# Patient Record
Sex: MALE | Race: WHITE
[De-identification: names, ages, dates, MRNs, and addresses within clinical notes are randomized per-mention and may not be internally consistent; named-entity substitution may affect disease eponyms.]

---

## 2018-02-27 ENCOUNTER — HOSPITAL ENCOUNTER (INPATIENT)
Dept: HOSPITAL 92 - CCL | Age: 53
LOS: 10 days | Discharge: HOME | DRG: 286 | End: 2018-03-09
Attending: INTERNAL MEDICINE | Admitting: INTERNAL MEDICINE
Payer: COMMERCIAL

## 2018-02-27 VITALS — BODY MASS INDEX: 21.1 KG/M2

## 2018-02-27 DIAGNOSIS — I48.0: ICD-10-CM

## 2018-02-27 DIAGNOSIS — I11.0: Primary | ICD-10-CM

## 2018-02-27 DIAGNOSIS — E87.6: ICD-10-CM

## 2018-02-27 DIAGNOSIS — R13.10: ICD-10-CM

## 2018-02-27 DIAGNOSIS — I08.1: ICD-10-CM

## 2018-02-27 DIAGNOSIS — E78.5: ICD-10-CM

## 2018-02-27 DIAGNOSIS — F20.9: ICD-10-CM

## 2018-02-27 DIAGNOSIS — I42.9: ICD-10-CM

## 2018-02-27 DIAGNOSIS — J69.0: ICD-10-CM

## 2018-02-27 DIAGNOSIS — F17.210: ICD-10-CM

## 2018-02-27 DIAGNOSIS — F31.9: ICD-10-CM

## 2018-02-27 DIAGNOSIS — B18.2: ICD-10-CM

## 2018-02-27 DIAGNOSIS — R11.2: ICD-10-CM

## 2018-02-27 DIAGNOSIS — I50.43: ICD-10-CM

## 2018-02-27 DIAGNOSIS — Z79.01: ICD-10-CM

## 2018-02-27 DIAGNOSIS — J95.821: ICD-10-CM

## 2018-02-27 DIAGNOSIS — K22.0: ICD-10-CM

## 2018-02-27 DIAGNOSIS — K74.69: ICD-10-CM

## 2018-02-27 DIAGNOSIS — K74.60: ICD-10-CM

## 2018-02-27 DIAGNOSIS — K76.6: ICD-10-CM

## 2018-02-27 DIAGNOSIS — G47.33: ICD-10-CM

## 2018-02-27 DIAGNOSIS — Z88.2: ICD-10-CM

## 2018-02-27 LAB
ANALYZER IN CARDIO: (no result)
ANALYZER IN CARDIO: (no result)
ANION GAP SERPL CALC-SCNC: 12 MMOL/L (ref 10–20)
BASE EXCESS STD BLDA CALC-SCNC: -0.7 MEQ/L
BASE EXCESS STD BLDA CALC-SCNC: 0.1 MEQ/L
BUN SERPL-MCNC: 10 MG/DL (ref 8.4–25.7)
CA-I BLDA-SCNC: 1.2 MMOL/L (ref 1.12–1.3)
CA-I BLDA-SCNC: 1.2 MMOL/L (ref 1.12–1.3)
CALCIUM SERPL-MCNC: 8.1 MG/DL (ref 7.8–10.44)
CHLORIDE SERPL-SCNC: 101 MMOL/L (ref 98–107)
CK MB SERPL-MCNC: 1.4 NG/ML (ref 0–6.6)
CO2 SERPL-SCNC: 26 MMOL/L (ref 22–29)
CREAT CL PREDICTED SERPL C-G-VRATE: 165 ML/MIN (ref 70–130)
GLUCOSE SERPL-MCNC: 81 MG/DL (ref 70–105)
HCO3 BLDA-SCNC: 27.9 MEQ/L (ref 22–26)
HCO3 BLDA-SCNC: 28 MEQ/L (ref 22–26)
HCT VFR BLDA CALC: 41.5 % (ref 42–52)
HCT VFR BLDA CALC: 48.1 % (ref 42–52)
HGB BLD-MCNC: 14.4 G/DL (ref 14–18)
HGB BLDA-MCNC: 12.3 G/DL (ref 14–18)
HGB BLDA-MCNC: 13.9 G/DL (ref 14–18)
MCH RBC QN AUTO: 31.8 PG (ref 27–31)
MCV RBC AUTO: 99.1 FL (ref 80–94)
MDIFF COMPLETE?: YES
O2 A-A PPRESDIFF RESPIRATORY: 343.98 MM[HG] (ref 0–20)
PCO2 BLDA: 60.5 MMHG (ref 35–45)
PCO2 BLDA: 64.9 MMHG (ref 35–45)
PH BLDA: 7.25 [PH] (ref 7.35–7.45)
PH BLDA: 7.28 [PH] (ref 7.35–7.45)
PLATELET # BLD AUTO: 196 THOU/UL (ref 130–400)
PLATELET BLD QL SMEAR: (no result)
PO2 BLDA: 70.5 MMHG (ref 80–100)
PO2 BLDA: 94.6 MMHG (ref 80–100)
POTASSIUM SERPL-SCNC: 3.5 MMOL/L (ref 3.5–5.1)
RBC # BLD AUTO: 4.53 MILL/UL (ref 4.7–6.1)
SODIUM SERPL-SCNC: 135 MMOL/L (ref 136–145)
SPECIMEN DRAWN FROM PATIENT: (no result)
SPECIMEN DRAWN FROM PATIENT: (no result)
TROPONIN I SERPL DL<=0.01 NG/ML-MCNC: 0.01 NG/ML (ref ?–0.03)
WBC # BLD AUTO: 3.5 THOU/UL (ref 4.8–10.8)

## 2018-02-27 PROCEDURE — 93798 PHYS/QHP OP CAR RHAB W/ECG: CPT

## 2018-02-27 PROCEDURE — C1769 GUIDE WIRE: HCPCS

## 2018-02-27 PROCEDURE — 94002 VENT MGMT INPAT INIT DAY: CPT

## 2018-02-27 PROCEDURE — 5A1945Z RESPIRATORY VENTILATION, 24-96 CONSECUTIVE HOURS: ICD-10-PCS | Performed by: INTERNAL MEDICINE

## 2018-02-27 PROCEDURE — P9047 ALBUMIN (HUMAN), 25%, 50ML: HCPCS

## 2018-02-27 PROCEDURE — 71045 X-RAY EXAM CHEST 1 VIEW: CPT

## 2018-02-27 PROCEDURE — 93005 ELECTROCARDIOGRAM TRACING: CPT

## 2018-02-27 PROCEDURE — 85018 HEMOGLOBIN: CPT

## 2018-02-27 PROCEDURE — 74220 X-RAY XM ESOPHAGUS 1CNTRST: CPT

## 2018-02-27 PROCEDURE — A4216 STERILE WATER/SALINE, 10 ML: HCPCS

## 2018-02-27 PROCEDURE — B246ZZ4 ULTRASONOGRAPHY OF RIGHT AND LEFT HEART, TRANSESOPHAGEAL: ICD-10-PCS | Performed by: INTERNAL MEDICINE

## 2018-02-27 PROCEDURE — 80076 HEPATIC FUNCTION PANEL: CPT

## 2018-02-27 PROCEDURE — 93306 TTE W/DOPPLER COMPLETE: CPT

## 2018-02-27 PROCEDURE — 93010 ELECTROCARDIOGRAM REPORT: CPT

## 2018-02-27 PROCEDURE — 99152 MOD SED SAME PHYS/QHP 5/>YRS: CPT

## 2018-02-27 PROCEDURE — 85049 AUTOMATED PLATELET COUNT: CPT

## 2018-02-27 PROCEDURE — 93312 ECHO TRANSESOPHAGEAL: CPT

## 2018-02-27 PROCEDURE — 87040 BLOOD CULTURE FOR BACTERIA: CPT

## 2018-02-27 PROCEDURE — 92960 CARDIOVERSION ELECTRIC EXT: CPT

## 2018-02-27 PROCEDURE — 36416 COLLJ CAPILLARY BLOOD SPEC: CPT

## 2018-02-27 PROCEDURE — 5A2204Z RESTORATION OF CARDIAC RHYTHM, SINGLE: ICD-10-PCS | Performed by: INTERNAL MEDICINE

## 2018-02-27 PROCEDURE — 85007 BL SMEAR W/DIFF WBC COUNT: CPT

## 2018-02-27 PROCEDURE — 93458 L HRT ARTERY/VENTRICLE ANGIO: CPT

## 2018-02-27 PROCEDURE — 85025 COMPLETE CBC W/AUTO DIFF WBC: CPT

## 2018-02-27 PROCEDURE — 85014 HEMATOCRIT: CPT

## 2018-02-27 PROCEDURE — 80053 COMPREHEN METABOLIC PANEL: CPT

## 2018-02-27 PROCEDURE — 94003 VENT MGMT INPAT SUBQ DAY: CPT

## 2018-02-27 PROCEDURE — 84484 ASSAY OF TROPONIN QUANT: CPT

## 2018-02-27 PROCEDURE — 36415 COLL VENOUS BLD VENIPUNCTURE: CPT

## 2018-02-27 PROCEDURE — 71046 X-RAY EXAM CHEST 2 VIEWS: CPT

## 2018-02-27 PROCEDURE — 82553 CREATINE MB FRACTION: CPT

## 2018-02-27 PROCEDURE — 85610 PROTHROMBIN TIME: CPT

## 2018-02-27 PROCEDURE — 85060 BLOOD SMEAR INTERPRETATION: CPT

## 2018-02-27 PROCEDURE — 80048 BASIC METABOLIC PNL TOTAL CA: CPT

## 2018-02-27 PROCEDURE — 85027 COMPLETE CBC AUTOMATED: CPT

## 2018-02-27 PROCEDURE — 0BH17EZ INSERTION OF ENDOTRACHEAL AIRWAY INTO TRACHEA, VIA NATURAL OR ARTIFICIAL OPENING: ICD-10-PCS | Performed by: INTERNAL MEDICINE

## 2018-02-27 PROCEDURE — 82565 ASSAY OF CREATININE: CPT

## 2018-02-27 PROCEDURE — 82805 BLOOD GASES W/O2 SATURATION: CPT

## 2018-02-27 RX ADMIN — FENTANYL CITRATE SCH MLS: 50 INJECTION, SOLUTION INTRAMUSCULAR; INTRAVENOUS at 17:45

## 2018-02-27 NOTE — HP
DATE OF CONSULTATION:  02/27/2018

 

CRITICAL CARE TIME:  1 hour.

 

HISTORY OF PRESENT ILLNESS:  This is an unfortunate 52-year-old gentleman with 
a history of severe cardiomyopathy who underwent electrical cardioversion and 
developed acute respiratory failure.  The patient has a previous history of 
congestive heart failure.  He was seen initially several days ago with rapid 
atrial fibrillation and he was started on digoxin.  The patient today underwent 
electrical cardioversion and following the procedure, he went into respiratory 
distress.

 

PAST MEDICAL HISTORY:

1.  Severe cardiomyopathy.

2.  Atrial fibrillation.

3.  Bipolar disorder.

4.  Hepatitis C.

5.  Hyperlipidemia.

6.  Hypertension.

 

ALLERGIES:  SULFA DRUGS.

 

MEDICATION ON ADMISSION: Amiodarone 400 b.i.d., Lipitor 10 at bedtime, digoxin 
0.5 daily, Aldactone 25 daily, valsartan 160 daily, Lasix 40 daily, metoprolol 
50 b.i.d., apixaban 5 b.i.d., potassium 20 b.i.d., iron sulfate 325 daily.

 

PHYSICAL EXAMINATION:

GENERAL:  Intubated gentleman.

VITAL SIGNS:  Blood pressure 84/56.

NECK:  Showed jugular venous distention in the jaw.

LUNGS:  Crackles throughout both lung fields.

HEART:  Regular rate and rhythm with a normal S1, S2, 2/6 systolic murmur.

ABDOMEN:  Distended.

EXTREMITIES:  Showed severe edema.

 

LABORATORY RESULTS AND IMAGING:  White blood cell count 7.0, hemoglobin 12.1, 
hematocrit 28.1 and his platelets are 218.  His sodium was 136, potassium 3.7, 
chloride 102, bicarbonate 26, BUN 10, creatinine 0.92, glucose is 54.  His EKG 
revealed normal sinus rhythm, nonspecific ST abnormality.

 

IMPRESSION:

1.  Respiratory failure.

2.  Congestive heart failure.

3.  Severe cardiomyopathy.

4.  Atrial fibrillation.

5.  Hypertension.

6.  Dyslipidemia.

7.  Tobacco abuse.

8.  Hepatitis C.

 

This gentleman presents with respiratory failure after undergoing electrical 
cardioversion.  From a cardiac standpoint, he will remain on IV amiodarone.  We 
will continue with IV amiodarone to maintain sinus rhythm.  The patient is 
being diuresed with IV Lasix.  His prognosis is very guarded.

 

[facility]

## 2018-02-27 NOTE — RAD
AP CHEST:

 

Indication: Possible aspiration. Intubation. 

 

Comparison: Two views chest, 11-15-17

 

FINDINGS: 

There is moderate cardiomegaly with mild to moderate pulmonary vascular congestion. There are patchy 
airspace opacities within the right lung base which can be seen with aspiration. No confluent airspac
e opacity, pleural effusion or pneumothorax is evident. ET tube tip is seen in the expected region ne
ar the level of the thoracic inlet. No acute osseous abnormality is evident. There is a mildly displa
gloria posterior lateral right 6th rib fracture. 

 

IMPRESSION: 

1. Cardiomegaly with pulmonary vascular congestion. 

2. Patchy opacities within the right lung base may reflect airspace edema or could be related to aspi
ration. Continued follow up is recommended. 

3. There is a mildly displaced right posterior lateral 6th rib fracture which is new from the compari
son. 

 

POS: [facility]

## 2018-02-27 NOTE — ECHO
52-year-old gentleman with severe cardiomyopathy and atrial fibrillation.  

 

Patient taken to PACU. The patient was sedated by anesthesiology.  
Transesophageal probe was removed 

 The patient developed respiratory distress and was intubated.

FINDINGS: 

1.  Severe decrease in left systolic function.

2.  Left ventricle is markedly dilated.

3.  The right atrium is markedly dilated.

4.  Left atrial enlargement

5.  Moderate mitral regurgitation.

6.  Moderate to severe tricuspid regurgitation.

7.  No thrombus in left atrium or left atrial appendage. 

8.  Atherosclerotic debris in the descending.

 

IMPRESSION:  

No formed thrombus in left atrium or left atrial appendage.

MTDD

## 2018-02-27 NOTE — RAD
AP VIEW CHEST

2/27/18

 

HISTORY: 

Ventilator patient. 

 

AP view chest obtained on 2/27/18.

 

Comparison made to a previous exam from earlier in the day on 2/27/18.

 

AP view chest demonstrates placement of a nasogastric tube since the previous exam. Distal tip not vi
sualized. The endotracheal tube is in good position. 

 

Bilateral air space opacity seen compatible with pulmonary edema. These appear to have increased sinc
e the previous comparison exam from earlier in the day. 

 

There is also some blunting of the costophrenic angles with veiling densities in the lung bases carla
tible with possible bilateral pleural effusions. 

 

IMPRESSION:  

Increasing extensive perihilar opacities since the previous exam from earlier in the day. This may re
present developing congestive  heart failure and pulmonary edema. 

 

POS: [facility]

## 2018-02-28 LAB
ANALYZER IN CARDIO: (no result)
ANION GAP SERPL CALC-SCNC: 11 MMOL/L (ref 10–20)
BASE EXCESS STD BLDA CALC-SCNC: 4.2 MEQ/L
BUN SERPL-MCNC: 14 MG/DL (ref 8.4–25.7)
CA-I BLDA-SCNC: 1.1 MMOL/L (ref 1.12–1.3)
CALCIUM SERPL-MCNC: 8.4 MG/DL (ref 7.8–10.44)
CHLORIDE SERPL-SCNC: 103 MMOL/L (ref 98–107)
CO2 SERPL-SCNC: 26 MMOL/L (ref 22–29)
CREAT CL PREDICTED SERPL C-G-VRATE: 160 ML/MIN (ref 70–130)
GLUCOSE SERPL-MCNC: 75 MG/DL (ref 70–105)
HCO3 BLDA-SCNC: 27.5 MEQ/L (ref 22–26)
HCT VFR BLDA CALC: 41.7 % (ref 42–52)
HGB BLD-MCNC: 13.2 G/DL (ref 14–18)
HGB BLDA-MCNC: 12.9 G/DL (ref 14–18)
MCH RBC QN AUTO: 31.3 PG (ref 27–31)
MCV RBC AUTO: 96.3 FL (ref 80–94)
MDIFF COMPLETE?: YES
O2 A-A PPRESDIFF RESPIRATORY: 267.18 MM[HG] (ref 0–20)
PCO2 BLDA: 36.5 MMHG (ref 35–45)
PH BLDA: 7.5 [PH] (ref 7.35–7.45)
PLATELET # BLD AUTO: 175 THOU/UL (ref 130–400)
PLATELET BLD QL SMEAR: (no result)
PO2 BLDA: 186.3 MMHG (ref 80–100)
POTASSIUM SERPL-SCNC: 4.2 MMOL/L (ref 3.5–5.1)
RBC # BLD AUTO: 4.22 MILL/UL (ref 4.7–6.1)
REFLEX FOR REVIEW??: YES
SODIUM SERPL-SCNC: 136 MMOL/L (ref 136–145)
SPECIMEN DRAWN FROM PATIENT: (no result)
WBC # BLD AUTO: 11.2 THOU/UL (ref 4.8–10.8)

## 2018-02-28 RX ADMIN — FENTANYL CITRATE SCH MLS: 50 INJECTION, SOLUTION INTRAMUSCULAR; INTRAVENOUS at 13:09

## 2018-02-28 RX ADMIN — Medication SCH ML: at 09:58

## 2018-02-28 RX ADMIN — Medication SCH ML: at 21:29

## 2018-02-28 NOTE — EKG
Test Reason : S/P PATRICE CARDIOVERSIO

Blood Pressure : ***/*** mmHG

Vent. Rate : 071 BPM     Atrial Rate : 071 BPM

   P-R Int : 172 ms          QRS Dur : 092 ms

    QT Int : 420 ms       P-R-T Axes : 065 -07 025 degrees

   QTc Int : 456 ms

 

Sinus rhythm with Premature atrial complexes

Nonspecific ST abnormality

Poor anterior R wave progression

Abnormal ECG

No previous ECGs available

Confirmed by DR. OLESYA MCLEOD (3) on 2/28/2018 7:28:11 AM

 

Referred By:  NYLA           Confirmed By:DR. OLESYA MCLEOD

## 2018-02-28 NOTE — EKG
Test Reason : 

Blood Pressure : ***/*** mmHG

Vent. Rate : 120 BPM     Atrial Rate : 120 BPM

   P-R Int : 176 ms          QRS Dur : 070 ms

    QT Int : 308 ms       P-R-T Axes : 070 034 064 degrees

   QTc Int : 435 ms

 

Sinus tachycardia

Possible Left atrial enlargement

Septal infarct , age undetermined

Abnormal ECG

When compared with ECG of 27-FEB-2018 10:21, (Unconfirmed)

Premature atrial complexes are no longer Present

Vent. rate has increased BY  49 BPM

QRS duration has decreased

Septal infarct is now Present

Nonspecific T wave abnormality now evident in Lateral leads

Confirmed by DR. OLESYA MCLEOD (3) on 2/28/2018 6:49:13 PM

 

Referred By:  YARELI           Confirmed By:DR. OLESYA MCLEOD

## 2018-02-28 NOTE — RAD
PORTABLE AP CHEST RADIOGRAPH:

 

Date: 2-28-18 

 

History: On ventilator. Follow up evaluation. 

 

Comparison: 2-27-18

 

FINDINGS: 

Endotracheal tube and nasogastric tubes remain in place and unchanged in position. Again noted are in
creased interstitial and alveolar opacities within the lungs bilaterally with mild improvement in franco
eolar opacities bilaterally. Cardiac silhouette is magnified by projection but does appear mildly enl
arged. Pulmonary vasculature is within normal limits. No other interval change. 

 

IMPRESSION: 

Improvement in interstitial and alveolar opacities bilaterally. This may represent mild improvement i
n asymmetric pulmonary edema or infectious process. Continued follow up is recommended. 

 

POS: [facility]

## 2018-02-28 NOTE — CON
DATE OF CONSULTATION:  02/27/2018

 

HISTORY OF PRESENT ILLNESS:  Mr. Orozco is a gentleman who was admitted for transesophageal echo and c
ardioversion.  After this procedure, apparently developed respiratory distress, was intubated and was
 transferred to the ICU, I was consulted.

 

PAST MEDICAL HISTORY:

1.  Remarkable for atrial fibrillation.

2.  History of severe cardiomyopathy.

3.  History of bipolar disorder.

4.  History of hepatitis C.

5.  History of lipid disorder.

6.  History of hypertension.

 

SOCIAL HISTORY:  It is unknown whether he smokes or drinks.

 

ALLERGIES:  There is a SULFA allergy reported.

 

MEDICATIONS:  Have been reviewed.

 

REVIEW OF SYSTEMS:  Not obtainable since he is intubated.  I did meet with his mom and answered all o
f her questions.

 

PHYSICAL EXAMINATION:

GENERAL:  He initially was mildly hypertensive and then became hypotensive after Lasix that was given
 earlier kicked in.  He had over a liter out of his bladder when his Herrera was inserted after arrival
 in the ICU, required the addition of dopamine.  He was given a liter of fluid back.  He has had prog
ressive respiratory distress throughout the day and required deep sedation and paralytics.  He appear
s older than his age.

VITAL SIGNS:  Currently, his blood pressure 103/72, heart rate is 94, respiratory rate is 24, oximetr
y is 97.  His PEEP had to be turned up to 13 earlier.

HEENT:  His pupils react.  Sclerae is anicteric.

NECK:  Supple.

LUNGS:  Remarkable for diffuse rhonchi and crackles.

HEART:  Regular rhythm.

ABDOMEN:  Soft and nontender.

EXTREMITIES:  Without asymmetry or clubbing or cyanosis.

NEUROLOGIC:  Nonfocal.

 

LABORATORY DATA:  White count 3.5, hemoglobin 14.4, platelets 196.  He had 40% bands on his periphera
l smear.  Blood gas pH of 7.25, CO2 of 64, pO2 of 70.

 

Sodium 135, potassium 3.5, chloride 101, bicarbonate 26, BUN 10, creatinine 0.82.

 

IMPRESSION:  Respiratory failure.  Chest radiograph shows progressive bilateral infiltrates.  I would
 like to believe that his left shift is simply demargination with the stress of acute congestive hear
t failure.

 

I do think broad antimicrobial therapy should be started after blood cultures were done.  I doubt thi
s is pneumonia, but it still certainly in the differential.  He appears to hopefully be stabilizing.

 

Critical care with multiple visits and interventions as well as multiple mechanical ventilation soriano
 throughout the day total of 90 minutes.

## 2018-02-28 NOTE — PRG
DATE OF SERVICE:  02/28/2018

 

SUBJECTIVE:  Chris Orozco was evaluated this morning and this afternoon.  He is awake and alert.  He mo
ves all extremities.  He writes and can communicate.  He is still on 3 mcg of dopamine.  His blood pr
essure is in the 90s.  Try to wean off the dopamine with a goal pressure in the 80s.  A 25% albumin m
ay help to maintain his blood pressure.

 

PHYSICAL EXAMINATION:

LUNGS:  Remarkable for coarse equal breath sounds.

HEART:  Regular rhythm.

ABDOMEN:  Soft and nontender.

EXTREMITIES:  Without asymmetry.

 

LABORATORY DATA AND IMAGING DATA:  White count 11.2, hemoglobin 13.2, platelets 175.

 

Electrolytes are normal.

 

A pH 7.5, pCO2 36, pO2 186.

 

We have turned his rate down, and I will turn him down further to a rate of 80; he is down to an FiO2
 of 40%.

 

We turned his PEEP down.

 

I reviewed his chest radiograph.

 

Chest radiograph still shows infiltrates, but his infiltrates appear to have improved somewhat.

 

IMPRESSION:

1.  Acute congestive heart failure.

2. ?  pulmonary hemorrhage or a component of noncardiogenic pulmonary edema on presentation.  I doubt
 this is all pneumonia given that he presented for an elective cardioversion.  We will continue with 
empiric antimicrobial therapy for now to gradually decrease ventilatory support.

 

CRITICAL CARE TIME:  30 minutes.

## 2018-03-01 LAB
ANALYZER IN CARDIO: (no result)
ANION GAP SERPL CALC-SCNC: 10 MMOL/L (ref 10–20)
BASE EXCESS STD BLDA CALC-SCNC: 2.3 MEQ/L
BUN SERPL-MCNC: 19 MG/DL (ref 8.4–25.7)
CA-I BLDA-SCNC: 1.2 MMOL/L (ref 1.12–1.3)
CALCIUM SERPL-MCNC: 8.7 MG/DL (ref 7.8–10.44)
CHLORIDE SERPL-SCNC: 103 MMOL/L (ref 98–107)
CO2 SERPL-SCNC: 26 MMOL/L (ref 22–29)
CREAT CL PREDICTED SERPL C-G-VRATE: 156 ML/MIN (ref 70–130)
GLUCOSE SERPL-MCNC: 48 MG/DL (ref 70–105)
HCO3 BLDA-SCNC: 27.6 MEQ/L (ref 22–26)
HCT VFR BLDA CALC: 35.5 % (ref 42–52)
HGB BLD-MCNC: 11.8 G/DL (ref 14–18)
HGB BLDA-MCNC: 11.1 G/DL (ref 14–18)
O2 A-A PPRESDIFF RESPIRATORY: 132.68 MM[HG] (ref 0–20)
PCO2 BLDA: 46.1 MMHG (ref 35–45)
PH BLDA: 7.4 [PH] (ref 7.35–7.45)
PLATELET # BLD AUTO: 150 THOU/UL (ref 130–400)
PO2 BLDA: 94.9 MMHG (ref 80–100)
POTASSIUM SERPL-SCNC: 3.7 MMOL/L (ref 3.5–5.1)
SODIUM SERPL-SCNC: 135 MMOL/L (ref 136–145)
SPECIMEN DRAWN FROM PATIENT: (no result)

## 2018-03-01 RX ADMIN — FENTANYL CITRATE SCH MLS: 50 INJECTION, SOLUTION INTRAMUSCULAR; INTRAVENOUS at 18:52

## 2018-03-01 RX ADMIN — Medication SCH ML: at 09:41

## 2018-03-01 RX ADMIN — Medication SCH ML: at 21:58

## 2018-03-01 NOTE — PRG
DATE OF SERVICE:  03/01/2018

 

Mr. Orozco is awake this morning.  He can move all of his extremities.  He nodded to questions.  He de
nies having fever leading up to this admission. 

 

PHYSICAL EXAMINATION: 

VITAL SIGNS:  Blood pressure 110/71, heart rate 80, respiratory rate 17.

Intake and outputs negative 184.

LUNGS:  Remarkable for coarse rhonchi diffusely.

CARDIOVASCULAR:  Regular rhythm.  S1 and S2 are normal.

ABDOMEN:  Soft and nontender.

EXTREMITIES:  Without asymmetry.

 

Hemoglobin this morning was 11.8, platelets 150,000, white count yesterday was 11.2.

 

Sodium 135, potassium 3.7, chloride 103, bicarb 26, creatinine 0.86.

 

IMPRESSION:

1.  Cardiomyopathy.

2.  Atrial fibrillation, status post cardioversion.

3.  Pulmonary edema.

4.  ?  Component of pneumonia.  It is puzzling why he did not clear quickly with mechanical ventilati
on and makes me worry that maybe he has developing pneumonia when he presented to the hospital for hi
s cardioversion, he is being treated empirically.

 

Blood cultures are negative so far.

 

We are making progress, but he is not weanable at this point in time in my opinion.  I explained this
 to him.  It is okay if he is kept sedated.  I reviewed his chest radiograph and do not see a signifi
cant change in his radiograph from yesterday to today.  If this is all cardiogenic pulmonary edema I 
would expect some gradual improvement.  I suppose he could have alveolar hemorrhage with his congesti
ve heart failure which would lead to an abnormal radiograph, but he has had no hemoptysis.  We will c
ontinue with current critical care support.  It is fine to begin nutritional support.

 

Critical care time was 30 minutes.

## 2018-03-01 NOTE — RAD
SINGLE VIEW OF THE CHEST:

 

Comparison: 2-28-18

 

Indication:  Ventilated patient. 

 

FINDINGS: 

Progressive opacification is seen at the right hemithorax. There remains a prominent edema. Support l
yesy and tubes remain. Extrinsic artifact limits detail. Cardiac silhouette and pulmonary vasculature
 are enlarged. 

 

IMPRESSION: 

Extensive bilateral opacities, right greater than left, indicating edema. Slightly more confluent on 
the right. Continued follow up is warranted. 

 

POS: [facility]

## 2018-03-02 LAB
ANION GAP SERPL CALC-SCNC: 10 MMOL/L (ref 10–20)
APTT PPP: 35.5 SEC (ref 22.9–36.1)
BUN SERPL-MCNC: 21 MG/DL (ref 8.4–25.7)
CALCIUM SERPL-MCNC: 8.8 MG/DL (ref 7.8–10.44)
CHLORIDE SERPL-SCNC: 104 MMOL/L (ref 98–107)
CO2 SERPL-SCNC: 29 MMOL/L (ref 22–29)
CREAT CL PREDICTED SERPL C-G-VRATE: 153 ML/MIN (ref 70–130)
GLUCOSE SERPL-MCNC: 79 MG/DL (ref 70–105)
HGB BLD-MCNC: 12.1 G/DL (ref 14–18)
INR PPP: 1.1
MCH RBC QN AUTO: 31.1 PG (ref 27–31)
MCV RBC AUTO: 99.6 FL (ref 80–94)
MDIFF COMPLETE?: YES
PLATELET # BLD AUTO: 170 THOU/UL (ref 130–400)
PLATELET BLD QL SMEAR: (no result)
POLYCHROMASIA BLD QL SMEAR: (no result) (100X)
POTASSIUM SERPL-SCNC: 3.8 MMOL/L (ref 3.5–5.1)
PROTHROMBIN TIME: 14 SEC (ref 12–14.7)
RBC # BLD AUTO: 3.88 MILL/UL (ref 4.7–6.1)
SODIUM SERPL-SCNC: 139 MMOL/L (ref 136–145)
WBC # BLD AUTO: 13.3 THOU/UL (ref 4.8–10.8)

## 2018-03-02 RX ADMIN — Medication SCH ML: at 21:19

## 2018-03-02 RX ADMIN — Medication SCH ML: at 09:18

## 2018-03-02 NOTE — PRG
DATE OF SERVICE:  03/02/2018

 

SUBJECTIVE:  Mr. Orozco apparently became very combative last night.  He is schizophrenic, so I have s
tarted him on Haldol, to be given 10 mg IM q.12 hours as well as 1 mg Ativan with it, IV q.12 hours i
s scheduled at the same time as the Haldol.

 

He was calm when I evaluated this morning.  His nutrition has been started.

 

OBJECTIVE:

LUNGS:  Still remarkable for coarse breath sounds.

HEART:  Regular rhythm.

ABDOMEN:  Soft.

EXTREMITIES:  Without asymmetry.

 

IMAGING DATA:  Chest radiographs still shows bilateral alveolar infiltrates.

 

IMPRESSION:

1.  ? pneumonia with a big left shift on presentation for cardioversion.

2.  Status post urgent intubation after cardioversion because of hypoxemia and respiratory distress.

3.  Underlying cardiomyopathy.

4.  Status post cardioversion for atrial fibrillation.

5.  History of hepatitis C.

6.  Bipolar disorder.

7.  History of lipid disorder.

8.  History of hypertension.

 

PLAN:  He still has diffuse alveolar infiltrates bilaterally on radiograph when reviewed by me.  His 
intake and output is positive 456.  His blood pressure will not allow a lot of diuresis, but I suspec
t if this was simply pulmonary edema, he would be getting better radiographically each day.  I suspec
t this is either alveolar hemorrhage with decompensated heart failure or pneumonia.  He does appear t
o have stabilized.  We will add Precedex today.  We will continue on the Haldol.  Hopefully, the Prec
edex will allow minimization of his sedation.

 

CRITICAL CARE TIME:  30 minutes.

## 2018-03-02 NOTE — RAD
PORTABLE CHEST 1 VIEW:

 

Date:  03/02/18 

Time:  0457 hours

 

HISTORY:  

Respiratory failure. 

 

FINDINGS/IMPRESSION: 

No significant interval change is seen since the previous day's exam. 

 

 

POS: [facility]

## 2018-03-02 NOTE — PQF
CLINICAL DOCUMENTATION IMPROVEMENT CLARIFICATION FORM:  ICD-10 Updated



PLEASE DO AN ADDENDUM TO THE PROGRESS NOTE WITH ANY DOCUMENTATION UPDATES OR 
ADDITIONS AND CARRY THROUGH TO DC SUMMARY.   THANK YOU.



DATE:  3/2                                                                     
                ATTN:  DR. STEFANY REDMOND



Please exercise your independent, professional judgment in responding to the 
clarification form. Clinical indicators are provided on the bottom of this form 
for your review.



Please check appropriate box(s):



      HEART FAILURE:



A.  TYPE:

             [  ] Systolic / HFrEF      [  ] Diastolic / HFpEF       [  ] 
Combined Systolic / Diastolic

B.  ACUITY

             [  ] Acute          [  ] Acute on Chronic          [  ] Chronic



[  ] Other diagnosis ___________

[  ] Unable to determine



In addition, please specify:

Present on Admission (POA):  [  ] Yes             [  ] No             [  ] 
Unable to determine



For continuity of documentation, please document condition throughout progress 
notes and discharge summary.  Thank You.



CLINICAL INDICATORS - SIGNS / SYMPTOMS / LABS



PHYSICIAN H&P DOCUMENTATION 2/27:  HX OF PRESENT ILLNESS:  HX OF SEVERE 
CARDIOMYOPATHY WHO UNDERWENT ELECTRICAL CARDIOVERSION & DEVELOPED ACUTE 
RESPIRATORY FAILURE.  THE PATIENT HAS A PREVIOUS HX OF CHF.  



PHYSICAL EXAM:  NECK:  SHOWED JVD IN THE JAW; EXTREMITIES:  SHOWED SEVERE 
EDEMA.       



IMPRESSION:  2. CONGESTIVE HEART FAILURE; 3.  SEVERE CARDIOMYOPATHY



PULMONOLOGY PN 2/28:  IMPRESSION:  1)  ACUTE CONGESTIVE HEART FAILURE



2/27 ECHO:  EF 35-40%, L ATRIUM MILDLY DILATED, MODERATELY ENLARGED R ATRIUM 
SIZE, L VENTRICULAR SIZE IS MILDLY INCREASED, SEVERELY ENLARGED R VENTRICLE 
CAVITY



RISKS:

HX OF CHF

SEVERE CARDIOMYOPATHY

HTN



TREATMENTS:

CCU MONITORING

ONE TIME DOSE IV LASIX BY ANESTHESIOLOGY PRIOR TO INTUBATION (2/27)

ECHO (2/27)



THANK YOU!  Sangeetha



(This form is maintained as a part of the permanent medical record)

 2015 [employer].  All Rights Reserved

Sangeetha Connelly, RN, BSN    [email or web address]    Office:  [phone]

                                                              

[facility]

## 2018-03-03 RX ADMIN — Medication SCH ML: at 08:46

## 2018-03-03 RX ADMIN — Medication SCH ML: at 21:09

## 2018-03-03 NOTE — RAD
RADIOGRAPH CHEST 1 VIEW:

 

Date: 3/3/18.

Time: 5:26 a.m.

 

HISTORY: 

A 52-year-old male in respiratory distress.

 

COMPARISON: 

3/2/18, 4:57 a.m.

 

FINDINGS:

Endotracheal tube.  On yesterday's study, a long segment of the nasogastric tube distal portion was t
ransversely oriented over the cardiac shadow, with distal tip just to the right of the junction betwe
en the mediastinum and right lateral cardiac border.  That has also been removed.  Severe cardiomegal
y remains.  There continues to be pulmonary edema, but this has improved significantly.  No pneumotho
rax.  Consolidation of left lower lobe.

 

IMPRESSION:

1.  Severe cardiomegaly and congestive heart failure with pulmonary interstitial edema, which has imp
roved since yesterday.

 

2.  Interval removal of endotracheal tube.

 

3.  Interval removal of the nasogastric tube, which was previously transversely oriented across the m
id to lower chest, suspected to have been in extraluminal location.  Recommend CT of the chest.

 

4.  Left lower lobe consolidation and left pleural effusion remain.

 

 

CODE T

 

 

JN []

 

POS: [facility]

## 2018-03-03 NOTE — PRG
DATE OF SERVICE:  03/03/2018

 

SERVICE:  Pulmonary Medicine

 

INTERVAL HISTORY:  The patient is doing great from a cardiovascular and 
respiratory standpoint.  Yesterday, we put him on 21% FiO2 and a PEEP of 5.  He 
tolerated this just fine.  We minimized sedation in preparation for extubating 
him this morning.  That being said, he beat us to the punch and ultimately self 
extubated.  He did extraordinarily well after this event occurred.  He is on 2 
liters nasal cannula and is in no respiratory distress.  He is a little bit 
confused right now and is pulling on lines and tubes.  That being said, he is 
not in any distress otherwise.

 

PHYSICAL EXAMINATION:

VITAL SIGNS:  Afebrile, pulse 68, blood pressure 145/92, respirations 17, 
saturation 96% on 2 liters nasal cannula.

GENERAL:  The patient is awake and alert.  No apparent distress.

LUNGS:  Decent air entry.  Crackles are present but minimal.  No prolonged 
expiratory phase or wheezing.

HEART:  Normal rate, regular.

ABDOMEN:  Soft, nontender, nondistended.  Bowel sounds are positive.

MUSCULOSKELETAL:  No cyanosis or clubbing.  There is 1+ pitting in the 
bilateral lower extremities.

NEUROLOGIC:  Grossly nonfocal.

 

LABORATORY DATA:  WBC 13.3, hemoglobin 12.1, platelets 170,000.  INR 1.1.  
Basic metabolic profile is completely unremarkable.  Blood cultures x2 are 
unremarkable.

 

IMAGING:  Chest x-ray demonstrates interval extubation.  Significant 
improvement in the alveolar airspace disease.  There is a loose left 
costophrenic angle suggesting a possible effusion on that side.  There is also 
blunting to the right costophrenic angle.  The heart is enlarged on this AP 
film.

 

ASSESSMENT:

1.  Acute hypoxic respiratory failure.

2.  Negative pressure pulmonary edema, suspected.

3.  Atrial fibrillation, status post cardioversion, currently normal sinus 
rhythm.

 

PLAN:  The patient is doing absolutely fantastic post-extubation.  We will 
discontinue his Herrera catheter and any other lines or tubes are attached to him 
to minimize the acute confusional state that he is in.  He denies any 
significant alcohol use.  We will mobilize him through the day and if he meets 
criteria, we will consider getting him out of the ICU.

 

SANDRA

## 2018-03-04 LAB
ANION GAP SERPL CALC-SCNC: 12 MMOL/L (ref 10–20)
BUN SERPL-MCNC: 12 MG/DL (ref 8.4–25.7)
CALCIUM SERPL-MCNC: 8.4 MG/DL (ref 7.8–10.44)
CHLORIDE SERPL-SCNC: 102 MMOL/L (ref 98–107)
CO2 SERPL-SCNC: 30 MMOL/L (ref 22–29)
CREAT CL PREDICTED SERPL C-G-VRATE: 197 ML/MIN (ref 70–130)
GLUCOSE SERPL-MCNC: 81 MG/DL (ref 70–105)
HGB BLD-MCNC: 12 G/DL (ref 14–18)
MCH RBC QN AUTO: 30.8 PG (ref 27–31)
MCV RBC AUTO: 95.2 FL (ref 80–94)
MDIFF COMPLETE?: YES
PLATELET # BLD AUTO: 168 THOU/UL (ref 130–400)
POTASSIUM SERPL-SCNC: 3.5 MMOL/L (ref 3.5–5.1)
RBC # BLD AUTO: 3.89 MILL/UL (ref 4.7–6.1)
SODIUM SERPL-SCNC: 140 MMOL/L (ref 136–145)
WBC # BLD AUTO: 9.2 THOU/UL (ref 4.8–10.8)

## 2018-03-04 RX ADMIN — Medication SCH ML: at 22:14

## 2018-03-04 RX ADMIN — Medication SCH ML: at 08:23

## 2018-03-04 NOTE — PRG
DATE OF SERVICE:  03/04/2018

 

SERVICE:  Pulmonary Medicine.

 

INTERVAL HISTORY:  Patient is doing fine from a respiratory standpoint.  He has 
been weaned down to 2 liters nasal cannula.  Truth be told, when he is awake, 
he does not need any oxygen at all, but when he goes to sleep, he desaturate 
down into the low 80s.  This associated with obstructive events.  He does not 
have any complaints of shortness of breath, fevers, chills, or cough.  He is 
not bringing up any sputum at this time.  Otherwise, he is returning to his 
usual state of health and has no specific concerns.  He remains a little bit 
confused and he has eating disorder, not otherwise specified.  He will 
intentionally vomiting up some of the food that he eats.

 

PHYSICAL EXAMINATION:

VITAL SIGNS:  Afebrile, pulse 66, blood pressure 152/96, respirations 10, 
saturation 97% on 2 liters nasal cannula.

GENERAL:  Patient is awake and alert.  He is in no apparent distress.

LUNGS:  Decent air entry.  There is rhonchi present.  Dependent crackles are 
minimal.  No prolonged expiratory phase or wheezing is present.

HEART:  Normal rate, regular.

ABDOMEN:  Soft, nontender, nondistended.  Bowel sounds are positive.

MUSCULOSKELETAL:  No cyanosis or clubbing.  There is no pitting in the 
bilateral lower extremities.

NEUROLOGIC:  Grossly nonfocal.

 

LABORATORY DATA:  WBC 9.2, hemoglobin 12.0, platelets 13.4.  Basic metabolic 
profile is otherwise unremarkable.  Bicarbonate 30.  Blood cultures x2 are 
unremarkable.

 

ASSESSMENT:

1.  Acute hypoxic respiratory failure.

2.  Negative pressure pulmonary edema, suspected.

3.  Atrial fibrillation, status post cardioversion, currently normal sinus 
rhythm.

4.  Obstructive sleep apnea, witnessed at bedside.

 

PLAN:  The patient is doing absolutely fantastic.  We will continue to wean 
away oxygen as tolerated.  He is stable for transition to the floor, but at 
this time, he would require a sitter.  He is a little impulsive and will jump 
up out of bed and lacks perfect stability at this time.  We will continue 
supportive care moving forward.  He may benefit from a polysomnogram in the 
outpatient setting.  Dr. Mcdonald will resume care in the morning for Pulmonary.

 

SANDRA

## 2018-03-05 LAB
ANION GAP SERPL CALC-SCNC: 12 MMOL/L (ref 10–20)
BASOPHILS # BLD AUTO: 0 THOU/UL (ref 0–0.2)
BASOPHILS NFR BLD AUTO: 0 % (ref 0–1)
BUN SERPL-MCNC: 9 MG/DL (ref 8.4–25.7)
CALCIUM SERPL-MCNC: 8.5 MG/DL (ref 7.8–10.44)
CHLORIDE SERPL-SCNC: 95 MMOL/L (ref 98–107)
CO2 SERPL-SCNC: 34 MMOL/L (ref 22–29)
CREAT CL PREDICTED SERPL C-G-VRATE: 169 ML/MIN (ref 70–130)
EOSINOPHIL # BLD AUTO: 0.1 THOU/UL (ref 0–0.7)
EOSINOPHIL NFR BLD AUTO: 1 % (ref 0–10)
GLUCOSE SERPL-MCNC: 89 MG/DL (ref 70–105)
HGB BLD-MCNC: 12.5 G/DL (ref 14–18)
LYMPHOCYTES # BLD: 1.9 THOU/UL (ref 1.2–3.4)
LYMPHOCYTES NFR BLD AUTO: 19.6 % (ref 21–51)
MCH RBC QN AUTO: 30.8 PG (ref 27–31)
MCV RBC AUTO: 94.1 FL (ref 80–94)
MONOCYTES # BLD AUTO: 0.9 THOU/UL (ref 0.11–0.59)
MONOCYTES NFR BLD AUTO: 9.1 % (ref 0–10)
NEUTROPHILS # BLD AUTO: 6.9 THOU/UL (ref 1.4–6.5)
NEUTROPHILS NFR BLD AUTO: 70.3 % (ref 42–75)
PLATELET # BLD AUTO: 162 THOU/UL (ref 130–400)
POTASSIUM SERPL-SCNC: 3.2 MMOL/L (ref 3.5–5.1)
RBC # BLD AUTO: 4.05 MILL/UL (ref 4.7–6.1)
SODIUM SERPL-SCNC: 138 MMOL/L (ref 136–145)
WBC # BLD AUTO: 9.8 THOU/UL (ref 4.8–10.8)

## 2018-03-05 RX ADMIN — Medication SCH: at 21:42

## 2018-03-05 RX ADMIN — Medication SCH ML: at 08:50

## 2018-03-05 NOTE — PDOC.PN
- Subjective


Encounter Start Date: 03/05/18


Encounter Start Time: 10:37


-: old records requested/rev





pt has nausea and vomiting, he is not able to hold anything, no chest pain, no 

dyspnea, no fever, on oxygen, No abdominal pain, or diarrhoea


Patient seen and examined. No overnight events





- Objective


MAR Reviewed: Yes


Vital Signs & Weight: 


 Vital Signs (12 hours)











  Temp Pulse Resp Pulse Ox


 


 03/05/18 08:05     98


 


 03/05/18 08:00  98.6 F  75  14  97


 


 03/05/18 00:00  98.3 F   








 Weight











Admit Weight                   245 lb 5.984 oz


 


Weight                         209 lb 10.554 oz











 Most Recent Monitor Data











Heart Rate from ECG            89


 


NIBP                           144/97


 


NIBP BP-Mean                   104


 


Respiration from ECG           23


 


SpO2                           95














I&O: 


 











 03/04/18 03/05/18 03/06/18





 06:59 06:59 06:59


 


Intake Total 911 515 630


 


Output Total 4200 6500 1076


 


Balance -3289 -5985 -446











Result Diagrams: 


 03/05/18 07:22





 03/05/18 07:22


Radiology Reviewed by me: Yes (chest xray-left lower lobe consolidation)


EKG Reviewed by me: Yes





Phys Exam





- Physical Examination


Constitutional: NAD


HEENT: PERRLA, moist MMs, sclera anicteric


Neck: no JVD, supple


Respiratory: no wheezing, no rhonchi


rales at left base


Cardiovascular: no significant murmur, no rub


Gastrointestinal: soft, non-tender, no distention, positive bowel sounds


Musculoskeletal: no edema, pulses present


skin changes lower leg


Neurological: non-focal, normal sensation, moves all 4 limbs


Lymphatic: no nodes


Psychiatric: normal affect


Skin: no rash, normal turgor





Dx/Plan


(1) Acute on chronic combined systolic and diastolic congestive heart failure


Code(s): I50.43 - ACUTE ON CHRONIC COMBINED SYSTOLIC AND DIASTOLIC HRT FAIL   

Status: Acute   





(2) Aspiration pneumonia


Code(s): J69.0 - PNEUMONITIS DUE TO INHALATION OF FOOD AND VOMIT   Status: 

Acute   


Qualifiers: 


   Laterality: left   Lung location: lower lobe of lung 





(3) Atrial fibrillation status post cardioversion


Code(s): I48.91 - UNSPECIFIED ATRIAL FIBRILLATION   Status: Acute   





(4) Hypokalemia


Code(s): E87.6 - HYPOKALEMIA   Status: Acute   





(5) Nausea & vomiting


Code(s): R11.2 - NAUSEA WITH VOMITING, UNSPECIFIED   Status: Acute   





(6) Bipolar disorder


Code(s): F31.9 - BIPOLAR DISORDER, UNSPECIFIED   Status: Chronic   





(7) Dyslipidemia


Code(s): E78.5 - HYPERLIPIDEMIA, UNSPECIFIED   Status: Chronic   





(8) Hypertension


Code(s): I10 - ESSENTIAL (PRIMARY) HYPERTENSION   Status: Chronic   





(9) ANDRES (obstructive sleep apnea)


Code(s): G47.33 - OBSTRUCTIVE SLEEP APNEA (ADULT) (PEDIATRIC)   Status: 

Suspected   





(10) Acute respiratory failure with hypoxia and hypercarbia


Code(s): J96.01 - ACUTE RESPIRATORY FAILURE WITH HYPOXIA; J96.02 - ACUTE 

RESPIRATORY FAILURE WITH HYPERCAPNIA   Status: Resolved   





(11) Chronic hepatitis C


Code(s): B18.2 - CHRONIC VIRAL HEPATITIS C   Status: Chronic   


Qualifiers: 


   Hepatic coma status: without hepatic coma   Qualified Code(s): B18.2 - 

Chronic viral hepatitis C   





- Plan


cont current plan of care, continue antibiotics, PT/OT, , DVT 

proph w/lovenox





* continue zosyn for aspiration pneumonia


* GI consulted for nausea and vomiting


* culture negative so far


* stable for transfer to [facility]


* medication reviewed as below


* Symptomatic treatment


* cardiology and pulmonary following


* check cbc, cmp tomorrow


* start PT








Review of Systems





- Review of Systems


Constitutional: negative: fever, chills, sweats, weakness, malaise, other


ENT: negative: Ear Pain, Ear Discharge, Nose Pain, Nose Discharge, Nose 

Congestion, Mouth Pain, Mouth Swelling, Throat Pain, Throat Swelling, Other


Respiratory: Cough, Shortness of Breath.  negative: Dry, Hemoptysis, SOB with 

Excertion, Pleuritic Pain, Sputum, Wheezing


Cardiovascular: negative: chest pain, palpitations, orthopnea, paroxysmal 

nocturnal dyspnea, edema, light headedness, other


Gastrointestinal: Nausea, Vomiting.  negative: Abdominal Pain, Diarrhea, 

Constipation, Melena, Hematochezia, Other


Genitourinary: negative: Dysuria, Frequency, Incontinence, Hematuria, Retention

, Other


Musculoskeletal: negative: Neck Pain, Shoulder Pain, Arm Pain, Back Pain, Hand 

Pain, Leg Pain, Foot Pain, Other


Skin: negative: Rash, Lesions, Ambrosio, Bruising, Other


Neurological: negative: Weakness, Numbness, Incoordination, Change in Speech, 

Confusion, Seizures, Other





- Medications/Allergies


Allergies/Adverse Reactions: 


 Allergies











Allergy/AdvReac Type Severity Reaction Status Date / Time


 


sulphur Allergy Intermediate sulphur Uncoded 02/26/18 12:25





   ointment/  





   rash  





   happened  





   as a child  











Medications: 


 Current Medications





Amiodarone HCl (Cordarone)  200 mg PO BID [facility]


   Last Admin: 03/05/18 08:49 Dose:  200 mg


Aspirin (Ecotrin)  325 mg PO DAILY [facility]


   Last Admin: 03/05/18 08:49 Dose:  325 mg


Atorvastatin Calcium (Lipitor)  10 mg PO HS [facility]


   Last Admin: 03/04/18 21:58 Dose:  10 mg


Carvedilol (Coreg)  3.125 mg PO BID-[facility]


Dextrose/Water (Dextrose 50%)  25 gm IVP PRN PRN


   PRN Reason: HYPOGLYCEMIA PROTOCOL


Enoxaparin Sodium (Lovenox)  40 mg SC 0900 [facility]


   Last Admin: 03/05/18 08:49 Dose:  40 mg


Furosemide (Lasix)  40 mg PO DAILY-AC [facility]


   Last Admin: 03/05/18 08:49 Dose:  40 mg


Glucagon (Glucagon)  1 mg IM PRN PRN


   PRN Reason: HYPOGLYCEMIA PROTOCOL


Haloperidol Lactate (Haldol)  10 mg IM 1000,2200 [facility]


   Last Admin: 03/05/18 10:01 Dose:  Not Given


Amiodarone HCl 450 mg/Miscellaneous Medication 1 each/ Dextrose/Water  259 mls 

@ 0 mls/hr IVPB INF [facility]; As Directed


   PRN Reason: Protocol


   Last Admin: 02/27/18 15:09 Dose:  259 mls


Dextrose/Water (D5w)  1,000 mls @ 0 mls/hr IV INF PRN; As Directed


   PRN Reason: HYPOGLYCEMIA PROTOCOL


Lorazepam (Ativan)  1 mg SLOW IVP 1000,2200 [facility]


   Last Admin: 03/05/18 10:02 Dose:  Not Given


Discontinue Previous Narcotic Pain Medications And Benzodiazepines  1 each FS 

.ONE [facility]


   Stop: 03/29/18 17:33


Risperidone (Risperdal Consta)  25 mg IM Q14D [facility]


Sodium Chloride (Flush - Normal Saline)  10 ml IVF Q12HR [facility]


   Last Admin: 03/05/18 08:50 Dose:  10 ml


Sodium Chloride (Flush - Normal Saline)  10 ml IVF PRN PRN


   PRN Reason: Saline Flush


Spironolactone (Aldactone)  25 mg PO QAM-WM [facility]


   Last Admin: 03/05/18 08:49 Dose:  25 mg


Valsartan (Diovan)  40 mg PO BID [facility]


   Last Admin: 03/05/18 08:50 Dose:  40 mg











History of Present Illnes





- History of Present Illness


Reason for Visit: admitted for cardioversion


History of Present Illness: 





admitted under cardiology for cardioversion for afib, after procedure pt had 

respiratory failure and required intubation, then he was admitted in CCU, he 

was suspected for aspiration pneumonia and he was having acute on chronic 

systolic CHF, which was treated with lasix and serial chest xray showed some 

improvement in pulmonary edema but consolidative changes persisted, he was kept 

on zosyn for pneumonia, he was extubated and he was doing ok up until now he 

has nausea and vomiting. pt has h/o hep c and per pt he had treatment, he lives 

with his mother and per her he has adrenal mass history, no diarrhoea





- Past Medical History


Cardiac: AFIB, CHF (systolic and right sided heart failure with ), Pulmonary 

hypertension


Hepatobiliary: Cirrhosis, Hep A/B/C (heptitis c)


Psych: Bipolar





- Past Surgical History


Past Surgical History: Other (colonoscopy and cardioversion )





- Past Family History


Family History: None





- Past Social History


Smoke: No


Alcohol: None


Drugs: None


Lives: With Family


Domestic Violence: Negative

## 2018-03-05 NOTE — PRG
DATE OF SERVICE:  03/05/2018

 

Mr. Orozco had his Risperdal Depo injection.  We will keep him on Haldol another day or two until this
 has been in his muscle long enough for him to start getting an effect from it.  He has been more 
perative.  

 

LUNGS: His lungs are clear today.

HEART:  Regular rhythm.  S1 and S2 are normal.  

ABDOMEN:  Abdomen is soft and nontender.

VITAL SIGNS:  He is afebrile, blood pressure 144/99, heart rate 80.

 

Chest radiograph on the 03/03/2018was improving.  We will get another x-ray in the morning.

 

IMPRESSION:

1.  Congestive heart failure.

2.  Respiratory failure secondary to congestive heart failure plus or minus pneumonia.

3.  Atrial fibrillation on amiodarone after cardioversion.

4.  Schizophrenia.

5.  Hypertension.

 

PLAN:  Continue current medications with gradually increase in his activity.

## 2018-03-05 NOTE — CON
DATE OF CONSULTATION:  03/05/2018

 

CHIEF COMPLAINT:  Chronic vomiting.

 

HISTORY OF PRESENT ILLNESS:  Mr. Orozco is a 52-year-old man who states that he has had problems with 
vomiting for the last 9 years.  He was admitted for transesophageal echo and cardioversion.  Followin
g the cardioversion, he developed respiratory failure and was intubated.  He has since been extubated
 and advanced his diet; however, now nursing reports everything that he eats he vomits back up.  He d
oes not have ongoing nausea.  He has had vomiting for years per his report.  Nursing states that the 
patient's mother did say that he has been having chronic problems with this as well.  The patient is 
somewhat unreliable with this history related to his schizophrenia.  The patient has been evaluated i
n GI clinic by Dr. Coats previously.  I am unable to access the record now to find out about endosco
py; however, he has a history of chronic hepatitis C.  In the past, couple years ago, he had a fibros
is testing done which was stage I and therefore Medicaid did not approve treatment for the hepatitis 
C.  Now, he shows signs of cirrhosis by MRI and apparently his disease has progressed significantly s
[place] previous testing.  He was noted to have an adrenal mass on the MRI as well last fall.  Patient r
eports that he has seen a couple surgeons about this, but was turned down for surgery due to risk.

 

PAST MEDICAL HISTORY:  Cardiomyopathy, atrial fibrillation, hepatitis C, bipolar disorder, schizophre
sue, hyperlipidemia, hypertension, apparent cirrhosis.

 

PAST SURGICAL HISTORY:  Colonoscopy.

 

FAMILY HISTORY:  Negative for GI malignancy.

 

SOCIAL HISTORY:  Smokes little over a pack a day.  He drinks a couple beers on the weekend.  No drugs
.

 

ALLERGIES:  SULFA.

 

CURRENT MEDICATIONS:  Amiodarone, which is apparently new over the last several days since cardiovers
ion.  Aspirin, atorvastatin, carvedilol, enoxaparin, furosemide, Haldol, lorazepam, risperidone, spir
onolactone, valsartan.

 

REVIEW OF SYSTEMS:  Negative x10 systems reviewed except as stated in history of present illness.

 

PHYSICAL EXAMINATION:

VITAL SIGNS:  Temperature 98.4, blood pressure 114/99, pulse 103.

GENERAL:  He is in no acute distress.  He is awake and oriented x3.

HEENT:  Eyes have no scleral icterus.  Oropharynx is clear, without lesions.

NECK:  No cervical or supraclavicular lymphadenopathy.

LUNGS:  Clear to auscultation bilaterally.

HEART:  Regular rate and rhythm without murmur.

ABDOMEN:  Soft, nontender, nondistended.  Bowel sounds are present.

EXTREMITIES:  No lower extremity edema.  He does have open sores on his legs below the knees which he
 was told it was related to the hepatitis C and he has been treated by wound care for this.

 

LABORATORY:  White blood cell count 9.8, hemoglobin 12.5, platelets 13.4, INR 1.1, creatinine 0.68, b
ilirubin 0.8, AST 32, ALT 16, alkaline phosphatase 140, albumin 3.1.  Hepatitis A and B antibodies we
re negative previously.  Hepatitis C was positive genotype 2B.

 

IMPRESSION:

1.  Apparent acute worsening of chronic nausea and vomiting.  He states he has been vomiting for year
s; however, since he has been here in the hospital, ever since he has tried oral diet, he has been vo
miting everything he has eaten.  He does not have ongoing nausea.  He has been started on amiodarone 
within the last several days which needs to be considered as a cause; however, it seems that the symp
toms actually preceded the use of this medication.

2.  Cirrhosis of the liver as evidenced by MRI.  He also is noted to have a low albumin.  The platele
ts are in the lower end of normal at 162.  He reportedly had serologic testing for fibrosis stage F0-
F1 back in 2015.  He has chronic hepatitis C genotype 2B and has not yet been treated.  Overall, he s
eems pretty well compensated from a liver standpoint with normal bilirubin, normal INR and normal cre
atinine.

 

RECOMMENDATIONS:

1.  Start proton pump inhibitor.

2.  We will plan EGD tomorrow to rule out gastric outlet obstruction or severe gastritis or ulcer.

 

PLAN:

1.  If the EGD is negative, then gastric emptying scan could be considered in the future, preferably 
as an outpatient when he is closer to his baseline.

2.  If he truly has had an acute worsening over the last few days, then amiodarone could be considere
d as a potential etiology for that.

3.  EGD tomorrow.

## 2018-03-06 LAB
ALBUMIN SERPL BCG-MCNC: 3 G/DL (ref 3.5–5)
ALP SERPL-CCNC: 106 U/L (ref 40–150)
ALT SERPL W P-5'-P-CCNC: 14 U/L (ref 8–55)
ANION GAP SERPL CALC-SCNC: 11 MMOL/L (ref 10–20)
AST SERPL-CCNC: 41 U/L (ref 5–34)
BASOPHILS # BLD AUTO: 0 THOU/UL (ref 0–0.2)
BASOPHILS NFR BLD AUTO: 0.6 % (ref 0–1)
BILIRUB SERPL-MCNC: 1.2 MG/DL (ref 0.2–1.2)
BUN SERPL-MCNC: 8 MG/DL (ref 8.4–25.7)
CALCIUM SERPL-MCNC: 8.8 MG/DL (ref 7.8–10.44)
CHLORIDE SERPL-SCNC: 92 MMOL/L (ref 98–107)
CO2 SERPL-SCNC: 36 MMOL/L (ref 22–29)
CREAT CL PREDICTED SERPL C-G-VRATE: 151 ML/MIN (ref 70–130)
EOSINOPHIL # BLD AUTO: 0.1 THOU/UL (ref 0–0.7)
EOSINOPHIL NFR BLD AUTO: 1.1 % (ref 0–10)
GLOBULIN SER CALC-MCNC: 3.2 G/DL (ref 2.4–3.5)
GLUCOSE SERPL-MCNC: 84 MG/DL (ref 70–105)
HGB BLD-MCNC: 12.2 G/DL (ref 14–18)
LYMPHOCYTES # BLD: 2.1 THOU/UL (ref 1.2–3.4)
LYMPHOCYTES NFR BLD AUTO: 27.1 % (ref 21–51)
MCH RBC QN AUTO: 30.4 PG (ref 27–31)
MCV RBC AUTO: 93.4 FL (ref 80–94)
MONOCYTES # BLD AUTO: 0.8 THOU/UL (ref 0.11–0.59)
MONOCYTES NFR BLD AUTO: 10.7 % (ref 0–10)
NEUTROPHILS # BLD AUTO: 4.7 THOU/UL (ref 1.4–6.5)
NEUTROPHILS NFR BLD AUTO: 60.5 % (ref 42–75)
PLATELET # BLD AUTO: 181 THOU/UL (ref 130–400)
POTASSIUM SERPL-SCNC: 3 MMOL/L (ref 3.5–5.1)
RBC # BLD AUTO: 4 MILL/UL (ref 4.7–6.1)
SODIUM SERPL-SCNC: 136 MMOL/L (ref 136–145)
WBC # BLD AUTO: 7.7 THOU/UL (ref 4.8–10.8)

## 2018-03-06 PROCEDURE — 0D958ZX DRAINAGE OF ESOPHAGUS, VIA NATURAL OR ARTIFICIAL OPENING ENDOSCOPIC, DIAGNOSTIC: ICD-10-PCS | Performed by: INTERNAL MEDICINE

## 2018-03-06 RX ADMIN — Medication SCH ML: at 20:33

## 2018-03-06 RX ADMIN — Medication SCH ML: at 08:01

## 2018-03-06 NOTE — RAD
TWO AP VIEWS OF THE CHEST:

 

Indication: Ventilation. 

 

Comparison: 3-3-18

 

FINDINGS: 

There is worsening airspace opacity within the right lower lobe. Cardiomegaly is improved. Pulmonary 
vascular congestion is improved. No pleural effusion or pneumothorax is evident. 

 

IMPRESSION: 

1. Improvement of cardiomegaly and pulmonary vascular congestion. 

2. Worsening airspace opacity in the right lower lobe may reflect subsegmental volume loss, however, 
involving pneumonia is not excluded. Recommend continued follow up.

 

POS: SHAHLA

## 2018-03-06 NOTE — OP
GASTROINTESTINAL ENDOSCOPY NOTE

 

DATE OF PROCEDURE:  03/06/2018

 

SURGEON:  Abel Lennon M.D.

 

ASSISTANT SURGEON:  None.

 

PROCEDURE PERFORMED:  Esophagogastroduodenoscopy, diagnostic.

 

INDICATION:

1.  Persistent nausea and vomiting.

2.  Cirrhosis.

 

MEDICATIONS:  See anesthesia record.

 

FINDINGS:  After discussion of the risks, benefits and alternatives of the procedure, informed consen
t was obtained and witnessed.  Pre-endoscopic cardiopulmonary examination was satisfactory.  Timeout 
was performed before sedation was achieved.  Sedation was achieved with anesthesia assistance in the 
endoscopy unit.  A Pentax adult upper endoscope was placed into the oropharynx and passed through the
 cricopharyngeus under direct visualization.  Upon intubation of the esophagus, I encountered a large
 amount of fluid and particulate food matter.  This was somewhat surprising as dysphagia had not real
ly been a part of the patient's symptoms.  I carefully suctioned out all of the fluid from the esopha
loly and got a good examination of the mucosa throughout.  The esophageal mucosa appeared normal.  The
re were no esophageal varices.  There was no evidence of any stricture.  The Z-line appeared normal. 
 The esophagus was not noted to have any peristaltic contractions throughout the entirety of the exam
.  The lower esophageal sphincter was not particularly hypertonic and the endoscope passed easily int
o the stomach.  I was able to gently push all of the food particles down into the gastric lumen.  For
james and retroflexed views of the entire gastric mucosa were obtained.  There were no gastric varices
 noted.  The gastric mucosa appeared normal.  The endoscope was passed through the pylorus and into t
he first and second portions of the duodenum, which also appeared normal.  The upper endoscope was th
en completely withdrawn and the patient allowed to recover.  The patient tolerated the procedure well
.  There were no immediate post-procedure complications.

 

IMPRESSION:

1.  Large amount of food particles and fluid and aperistaltic esophagus.

2.  Otherwise, normal esophagogastroduodenoscopy.

 

RECOMMENDATIONS:

1.  Full liquid diet.

2.  Esophageal manometry can be considered on an outpatient basis.

3.  Of note, the patient had an MRI in November which demonstrated an adrenal mass.  It is unclear to
 me whether this has been further worked up as it had been recommended by Dr. Coats at that time to 
make sure adrenal mass workup continues.

## 2018-03-07 PROCEDURE — B2111ZZ FLUOROSCOPY OF MULTIPLE CORONARY ARTERIES USING LOW OSMOLAR CONTRAST: ICD-10-PCS | Performed by: INTERNAL MEDICINE

## 2018-03-07 PROCEDURE — 4A023N7 MEASUREMENT OF CARDIAC SAMPLING AND PRESSURE, LEFT HEART, PERCUTANEOUS APPROACH: ICD-10-PCS | Performed by: INTERNAL MEDICINE

## 2018-03-07 PROCEDURE — B2151ZZ FLUOROSCOPY OF LEFT HEART USING LOW OSMOLAR CONTRAST: ICD-10-PCS | Performed by: INTERNAL MEDICINE

## 2018-03-07 RX ADMIN — Medication SCH ML: at 07:35

## 2018-03-07 RX ADMIN — Medication SCH: at 23:33

## 2018-03-07 NOTE — PRG
DATE OF SERVICE:  03/07/2018

 

SUBJECTIVE:  Chris Orozco has no complaints.  He is very pleasant, cooperative, in contrast to a week a
go.

 

OBJECTIVE:

VITAL SIGNS:  He is afebrile, heart rate 62, respiratory rate 17, oximetry is now 96 on room air, blo
od pressure 144/93.

LUNGS:  Clear.

HEART:  Regular rhythm.

ABDOMEN:  Soft.

 

IMAGING:  Chest radiograph still shows radiographic abnormalities.  Given his failure to clear.  I wo
uld suggest this is more consistent with an infectious/inflammatory process.

 

He underwent endoscopy yesterday for persistent nausea and vomiting.

 

IMPRESSION:

1.  Pneumonia.  I doubt this is an autoimmune process.

2.  Congestive heart failure.

3.  Pulmonary edema.

 

Probably just repeat a radiograph in a month after his discharge from the hospital.

 

He appears to be progressing nicely from a clinical standpoint.

 

A nurse or hospitalist put in his dictation that the patient had pneumonitis due to inhalation of kendall
d and vomiting.  I do not recall him ever vomiting and aspirating.  I certainly was never told this o
ccurred.

 

In any event, I will see him back in followup once he is discharged and follow him while he is still 
in the hospital.

## 2018-03-07 NOTE — PDOC.PN
- Subjective


Encounter Start Date: 03/07/18


Encounter Start Time: 08:10





Patient seen and examined. No new complaints. No overnight events





- Objective


MAR Reviewed: Yes


Vital Signs & Weight: 


 Vital Signs (12 hours)











  Temp Pulse Resp BP Pulse Ox


 


 03/07/18 08:00  98.4 F  78  18  


 


 03/07/18 07:28  98.4 F  78  18  129/82  93 L


 


 03/07/18 03:56  98.8 F  72  18  130/79  95








 Weight











Admit Weight                   245 lb 5.984 oz


 


Weight                         183 lb











 Most Recent Monitor Data











Heart Rate from ECG            110


 


NIBP                           115/86


 


NIBP BP-Mean                   103


 


Respiration from ECG           24


 


SpO2                           93














I&O: 


 











 03/06/18 03/07/18 03/08/18





 06:59 06:59 06:59


 


Intake Total 2630 2360 


 


Output Total 6556  


 


Balance -3926 2360 











Result Diagrams: 


 03/06/18 04:36





 03/06/18 04:36


EKG Reviewed by me: Yes





Phys Exam





- Physical Examination


Constitutional: NAD


HEENT: PERRLA, moist MMs, sclera anicteric


Neck: no JVD, supple


Respiratory: no wheezing, no rales, no rhonchi


Cardiovascular: RRR, no significant murmur, no rub


Gastrointestinal: soft, non-tender, no distention, positive bowel sounds


Musculoskeletal: no edema, pulses present


Neurological: non-focal, normal sensation, moves all 4 limbs


Psychiatric: normal affect, A&O x 3


Skin: no rash, normal turgor





Dx/Plan


(1) Acute on chronic combined systolic and diastolic congestive heart failure


Code(s): I50.43 - ACUTE ON CHRONIC COMBINED SYSTOLIC AND DIASTOLIC HRT FAIL   

Status: Acute   





(2) Aspiration pneumonia


Code(s): J69.0 - PNEUMONITIS DUE TO INHALATION OF FOOD AND VOMIT   Status: 

Resolved   


Qualifiers: 


   Laterality: left   Lung location: lower lobe of lung 





(3) Atrial fibrillation status post cardioversion


Code(s): I48.91 - UNSPECIFIED ATRIAL FIBRILLATION   Status: Acute   





(4) Hypokalemia


Code(s): E87.6 - HYPOKALEMIA   Status: Resolved   





(5) Nausea & vomiting


Code(s): R11.2 - NAUSEA WITH VOMITING, UNSPECIFIED   Status: Resolved   





(6) Bipolar disorder


Code(s): F31.9 - BIPOLAR DISORDER, UNSPECIFIED   Status: Chronic   





(7) Dyslipidemia


Code(s): E78.5 - HYPERLIPIDEMIA, UNSPECIFIED   Status: Chronic   





(8) Hypertension


Code(s): I10 - ESSENTIAL (PRIMARY) HYPERTENSION   Status: Chronic   





(9) ANDRES (obstructive sleep apnea)


Code(s): G47.33 - OBSTRUCTIVE SLEEP APNEA (ADULT) (PEDIATRIC)   Status: 

Suspected   





(10) Acute respiratory failure with hypoxia and hypercarbia


Code(s): J96.01 - ACUTE RESPIRATORY FAILURE WITH HYPOXIA; J96.02 - ACUTE 

RESPIRATORY FAILURE WITH HYPERCAPNIA   Status: Resolved   





(11) Chronic hepatitis C


Code(s): B18.2 - CHRONIC VIRAL HEPATITIS C   Status: Chronic   


Qualifiers: 


   Hepatic coma status: without hepatic coma   Qualified Code(s): B18.2 - 

Chronic viral hepatitis C   





- Plan


cont current plan of care, continue antibiotics





* medication reviewed as below


* symptomatic treatment


* today cardiac cath


* discharge decision as per primary team


* medically stable for discharge when cardiology ok.








Review of Systems





- Review of Systems


ENT: negative: Ear Pain, Ear Discharge, Nose Pain, Nose Discharge, Nose 

Congestion, Mouth Pain, Mouth Swelling, Throat Pain, Throat Swelling, Other


Respiratory: negative: Cough, Dry, Shortness of Breath, Hemoptysis, SOB with 

Excertion, Pleuritic Pain, Sputum, Wheezing


Cardiovascular: negative: chest pain, palpitations, orthopnea, paroxysmal 

nocturnal dyspnea, edema, light headedness, other


Gastrointestinal: negative: Nausea, Vomiting, Abdominal Pain, Diarrhea, 

Constipation, Melena, Hematochezia, Other


Genitourinary: negative: Dysuria, Frequency, Incontinence, Hematuria, Retention

, Other


Musculoskeletal: negative: Neck Pain, Shoulder Pain, Arm Pain, Back Pain, Hand 

Pain, Leg Pain, Foot Pain, Other


Skin: negative: Rash, Lesions, Ambrosio, Bruising, Other





- Medications/Allergies


Allergies/Adverse Reactions: 


 Allergies











Allergy/AdvReac Type Severity Reaction Status Date / Time


 


sulphur Allergy Intermediate sulphur Uncoded 02/26/18 12:25





   ointment/  





   rash  





   happened  





   as a child  











Medications: 


 Current Medications





Amiodarone HCl (Cordarone)  200 mg PO BID [facility]


   Last Admin: 03/07/18 05:18 Dose:  200 mg


Aspirin (Ecotrin)  325 mg PO DAILY [facility]


   Last Admin: 03/06/18 08:01 Dose:  325 mg


Atorvastatin Calcium (Lipitor)  10 mg PO HS [facility]


   Last Admin: 03/06/18 20:33 Dose:  10 mg


Carvedilol (Coreg)  3.125 mg PO BID-[facility]


   Last Admin: 03/07/18 05:18 Dose:  3.125 mg


Dextrose/Water (Dextrose 50%)  25 gm IVP PRN PRN


   PRN Reason: HYPOGLYCEMIA PROTOCOL


Furosemide (Lasix)  40 mg PO DAILY-[facility]


   Last Admin: 03/07/18 05:18 Dose:  40 mg


Glucagon (Glucagon)  1 mg IM PRN PRN


   PRN Reason: HYPOGLYCEMIA PROTOCOL


Amiodarone HCl 450 mg/Miscellaneous Medication 1 each/ Dextrose/Water  259 mls 

@ 0 mls/hr IVPB INF [facility]; As Directed


   PRN Reason: Protocol


   Last Admin: 02/27/18 15:09 Dose:  259 mls


Dextrose/Water (D5w)  1,000 mls @ 0 mls/hr IV INF PRN; As Directed


   PRN Reason: HYPOGLYCEMIA PROTOCOL


Discontinue Previous Narcotic Pain Medications And Benzodiazepines  1 each FS 

.ONE [facility]


   Stop: 03/29/18 17:33


Risperidone (Risperdal Consta)  25 mg IM Q14D [facility]


   Last Admin: 03/05/18 10:51 Dose:  25 mg


Sodium Chloride (Flush - Normal Saline)  10 ml IVF Q12HR [facility]


   Last Admin: 03/07/18 07:35 Dose:  10 ml


Sodium Chloride (Flush - Normal Saline)  10 ml IVF PRN PRN


   PRN Reason: Saline Flush


Spironolactone (Aldactone)  25 mg PO QAM-[facility]


   Last Admin: 03/07/18 05:18 Dose:  25 mg


Valsartan (Diovan)  80 mg PO BID [facility]


   Last Admin: 03/07/18 05:19 Dose:  80 mg

## 2018-03-07 NOTE — PRG
DATE OF SERVICE:  03/07/2018

 

SUBJECTIVE:  Mr. Orozco is n.p.o. and he is waiting for heart cath today.

 

OBJECTIVE:

VITAL SIGNS:  Temperature is 98.1, pulse is 63, and blood pressure is 132/88.

LUNGS:  Clear.

ABDOMEN:  Soft and nontender.

 

ASSESSMENT:

1.  With regard to his dysphagia, he has had vomiting problems he states about 3 years.  If it is liq
uids, he does fine.  Solids tend to bother him more.  He had an EGD yesterday with retained food in t
he esophagus, but no overt esophageal dilatation.  It is unclear if previous diagnosis of achalasia.

2.  The patient also has a history of cirrhosis by MRI and previously he had stage I fibrosis by sero
logic testing, but Medicare would not approve hepatitis C treatment.  Apparently, he has adrenal risk
 as well and has been turned down for surgery based on his liver disease.

3.  Atrial fibrillation.  He is on amiodarone, probably a poor choice in light of his underlying live
r disease.

 

RECOMMENDATIONS:

1.  Needs an upper GI to evaluate for possible achalasia.  If he does have this, treatment option wou
ld be difficult in light of his cirrhosis.

2.  Cirrhosis, hepatitis C history, seemingly not previously treated.  He has compensated at this alicia
e, but will need this treated.

3.  We would encourage Cardiology to consider treatment other than amiodarone, which can cause liver 
disease.  He already has cirrhosis.  We will get an upper GI once his cardiac workup is complete.  We
 will be happy to see him back in the office for hepatitis C treatment after discharge.

## 2018-03-08 LAB
CREAT CL PREDICTED SERPL C-G-VRATE: 129 ML/MIN (ref 70–130)
HGB BLD-MCNC: 11.5 G/DL (ref 14–18)
PLATELET # BLD AUTO: 198 THOU/UL (ref 130–400)

## 2018-03-08 RX ADMIN — Medication SCH ML: at 20:37

## 2018-03-08 RX ADMIN — Medication SCH ML: at 10:27

## 2018-03-08 RX ADMIN — PHENOL PRN SPR: 1.4 SPRAY ORAL at 20:37

## 2018-03-08 RX ADMIN — PHENOL PRN SPR: 1.4 SPRAY ORAL at 17:54

## 2018-03-08 NOTE — PRG
DATE OF SERVICE:  03/08/2018

 

SUBJECTIVE:  Mr. Orozco says he is feeling well.  There is actually quite cooperative and pleasant.

 

OBJECTIVE:

VITAL SIGNS:  He is afebrile, heart rate 67, respiratory rate is 18, temperature is 93 on room air, b
lood pressure 123/85.

LUNGS:  Clear.

HEART:  Regular rhythm.

ABDOMEN:  Soft.

 

He had a barium swallow today.  He also had a chest radiograph today.

 

IMPRESSION:

1.  Pneumonia versus alveolar hemorrhage.

2.  Congestive heart failure.

3.  Status post acute respiratory failure after cardioversion.

4.  Atrial fibrillation, status post cardioversion.

5.  Schizophrenia, on Risperdal.

6.  Hypertension.

 

PLAN:  Continue supportive care.

## 2018-03-08 NOTE — RAD
CHEST 2 VIEWS:

 

Date:  03/08/18 

 

HISTORY:  

Respiratory distress. Aspiration noted on barium esophagram. 

 

COMPARISON:  

03/06/18, 03/03/18. 

 

FINDINGS:

Residual contrast is still present in the prominent thoracic esophagus. There is a tapering with sign
ificant caliber change in the distal thoracic esophagus just proximal to the GE junction. 

 

There is contrast opacifying the proximal airway, compatible with penetration. Contrast does not opac
sujit the bronchi at this time to note definite aspiration. 

 

Stable cardiac silhouette. Chronic change in lung parenchyma. No pneumothorax. 

 

There is a right rib fracture involving the posterior 7th rib. Previously, the rib fracture was nondi
splaced. Currently, there does appear to be displacement. 

 

IMPRESSION: 

1.  Penetration without evidence of definite aspiration. Nevertheless, speech pathology evaluation is
 recommended. 

 

2.  Abrupt narrowing and stenosis involving the distal thoracic esophagus. 

 

3.  Displaced posterior right rib fracture, acute. 

 

CODE T.

 

 

POS: [facility]

## 2018-03-08 NOTE — PRG
DATE OF SERVICE:  03/08/2018

 

SUBJECTIVE:  Mr. Orozco had his upper GI today.  He was able to eat a soft diet today.  He reports he 
want to go home, but probably tomorrow, because he is getting a LifeVest from Dr. Murillo.

 

PHYSICAL EXAMINATION:

VITAL SIGNS:  Temperature is 98, pulse 78, blood pressure 128/86.

LUNGS:  Clear.

HEART:  Regular rate and rhythm without clicks or murmurs.

ABDOMEN:  Nontender.

 

LABORATORY STUDIES:  Hemoglobin is 11.5 today.  BUN and creatinine are stable.

 

ASSESSMENT:  The patient reports to me he is waiting for a LifeVest to go home with.  Usually, this w
as done for patient with severe EF or arrhythmias.  I will have to wait for Dr. Murillo's note roselyn hale that.  Previous cardiac status may impact on what we can do in terms of his dysphagia.

1.  Dysphagia, it appears that he has achalasia actually by the appearance of EGD and studies are dif
ficult as he has got some cirrhosis.  He has got significant cardiac disease.  The cirrhosis is the m
ain thing I worried about in terms of treatment.  He is probably not a good candidate for a Heller my
otomy and he definitely would not be a candidate for a pneumatic dilatation with the risk for esophag
eal varices or portal hypertension, even though he had done EGD.  Botox injection may be something to
 be considered, but again MA has been done in conjunction with EUS to ensure there is not any deep es
ophageal varices that were injecting.

2.  Hepatitis C.  We tried to treat him several times with Medicare, I did not feel he is advanced en
ough at that time for treatment.  Presently, he has got a nodular liver on MRI, which should make him
 advanced enough to treat.

 

PLAN:  I have recommended low residue diet.  He has not eaten for 6 hours before bedtime and he has b
een on some liquids at that time and he can follow up with me in the office in 1 to 2 weeks.

## 2018-03-08 NOTE — PDOC.PN
- Subjective


Encounter Start Date: 03/08/18


Encounter Start Time: 08:00





Patient seen and examined. No new complaints. No overnight events





- Objective


MAR Reviewed: Yes


Vital Signs & Weight: 


 Vital Signs (12 hours)











  Temp Pulse Resp BP Pulse Ox


 


 03/08/18 04:00  98.4 F  67  18  123/85  93 L








 Weight











Admit Weight                   245 lb 5.984 oz


 


Weight                         189 lb 6.4 oz











 Most Recent Monitor Data











Heart Rate from ECG            110


 


NIBP                           115/86


 


NIBP BP-Mean                   103


 


Respiration from ECG           24


 


SpO2                           93














I&O: 


 











 03/07/18 03/08/18 03/09/18





 06:59 06:59 06:59


 


Intake Total 2360 1240 


 


Output Total  1900 


 


Balance 2360 -660 











Result Diagrams: 


 03/08/18 05:24





 03/08/18 05:24


EKG Reviewed by me: Yes





Phys Exam





- Physical Examination


Constitutional: NAD


HEENT: PERRLA, moist MMs, sclera anicteric


Neck: no JVD, supple


Respiratory: no wheezing, no rales, no rhonchi


Cardiovascular: RRR, no significant murmur, no rub


Gastrointestinal: soft, non-tender, no distention, positive bowel sounds


Musculoskeletal: no edema, pulses present


Neurological: non-focal, normal sensation


Psychiatric: normal affect, A&O x 3


Skin: no rash, normal turgor





Dx/Plan


(1) Acute on chronic combined systolic and diastolic congestive heart failure


Code(s): I50.43 - ACUTE ON CHRONIC COMBINED SYSTOLIC AND DIASTOLIC HRT FAIL   

Status: Acute   





(2) Aspiration pneumonia


Code(s): J69.0 - PNEUMONITIS DUE TO INHALATION OF FOOD AND VOMIT   Status: 

Resolved   


Qualifiers: 


   Laterality: left   Lung location: lower lobe of lung 





(3) Atrial fibrillation status post cardioversion


Code(s): I48.91 - UNSPECIFIED ATRIAL FIBRILLATION   Status: Acute   





(4) Hypokalemia


Code(s): E87.6 - HYPOKALEMIA   Status: Resolved   





(5) Nausea & vomiting


Code(s): R11.2 - NAUSEA WITH VOMITING, UNSPECIFIED   Status: Resolved   





(6) Bipolar disorder


Code(s): F31.9 - BIPOLAR DISORDER, UNSPECIFIED   Status: Chronic   





(7) Dyslipidemia


Code(s): E78.5 - HYPERLIPIDEMIA, UNSPECIFIED   Status: Chronic   





(8) Hypertension


Code(s): I10 - ESSENTIAL (PRIMARY) HYPERTENSION   Status: Chronic   





(9) ANDRES (obstructive sleep apnea)


Code(s): G47.33 - OBSTRUCTIVE SLEEP APNEA (ADULT) (PEDIATRIC)   Status: 

Suspected   





(10) Acute respiratory failure with hypoxia and hypercarbia


Code(s): J96.01 - ACUTE RESPIRATORY FAILURE WITH HYPOXIA; J96.02 - ACUTE 

RESPIRATORY FAILURE WITH HYPERCAPNIA   Status: Resolved   





(11) Chronic hepatitis C


Code(s): B18.2 - CHRONIC VIRAL HEPATITIS C   Status: Chronic   


Qualifiers: 


   Hepatic coma status: without hepatic coma   Qualified Code(s): B18.2 - 

Chronic viral hepatitis C   





- Plan


cont current plan of care





* today barrium swallow


* medication reviewed as below


* symptomatic treatment


* otherwise stable.








Review of Systems





- Review of Systems


ENT: negative: Ear Pain, Ear Discharge, Nose Pain, Nose Discharge, Nose 

Congestion, Mouth Pain, Mouth Swelling, Throat Pain, Throat Swelling, Other


Respiratory: negative: Cough, Dry, Shortness of Breath, Hemoptysis, SOB with 

Excertion, Pleuritic Pain, Sputum, Wheezing


Cardiovascular: negative: chest pain, palpitations, orthopnea, paroxysmal 

nocturnal dyspnea, edema, light headedness, other


Gastrointestinal: negative: Nausea, Vomiting, Abdominal Pain, Diarrhea, 

Constipation, Melena, Hematochezia, Other


Genitourinary: negative: Dysuria, Frequency, Incontinence, Hematuria, Retention

, Other


Musculoskeletal: negative: Neck Pain, Shoulder Pain, Arm Pain, Back Pain, Hand 

Pain, Leg Pain, Foot Pain, Other


Skin: negative: Rash, Lesions, Ambrosio, Bruising, Other





- Medications/Allergies


Allergies/Adverse Reactions: 


 Allergies











Allergy/AdvReac Type Severity Reaction Status Date / Time


 


sulphur Allergy Intermediate sulphur Uncoded 02/26/18 12:25





   ointment/  





   rash  





   happened  





   as a child  











Medications: 


 Current Medications





Acetaminophen/Codeine Phosphate (Tylenol #3)  1 tab PO Q4H PRN


   PRN Reason: Mild Pain (1-3)


Acetaminophen/Codeine Phosphate (Tylenol #3)  2 tab PO Q4H PRN


   PRN Reason: Moderate Pain (4-6)


Amiodarone HCl (Cordarone)  200 mg PO BID [facility]


   Last Admin: 03/07/18 20:41 Dose:  200 mg


Aspirin (Ecotrin)  325 mg PO DAILY [facility]


   Last Admin: 03/07/18 12:28 Dose:  Not Given


Atorvastatin Calcium (Lipitor)  10 mg PO [facility]


   Last Admin: 03/07/18 20:41 Dose:  10 mg


Carvedilol (Coreg)  6.25 mg PO BID-[facility]


   Last Admin: 03/07/18 17:17 Dose:  6.25 mg


Dextrose/Water (Dextrose 50%)  25 gm IVP PRN PRN


   PRN Reason: HYPOGLYCEMIA PROTOCOL


Furosemide (Lasix)  40 mg PO DAILY-[facility]


   Last Admin: 03/07/18 05:18 Dose:  40 mg


Glucagon (Glucagon)  1 mg IM PRN PRN


   PRN Reason: HYPOGLYCEMIA PROTOCOL


Amiodarone HCl 450 mg/Miscellaneous Medication 1 each/ Dextrose/Water  259 mls 

@ 0 mls/hr IVPB INF JUNO; As Directed


   PRN Reason: Protocol


   Last Admin: 02/27/18 15:09 Dose:  259 mls


Dextrose/Water (D5w)  1,000 mls @ 0 mls/hr IV INF PRN; As Directed


   PRN Reason: HYPOGLYCEMIA PROTOCOL


Nitroglycerin (Nitrostat)  0.4 mg SL Q5MIN PRN


   PRN Reason: Chest Pain


Discontinue Previous Narcotic Pain Medications And Benzodiazepines  1 each FS 

.ONE [facility]


   Stop: 03/29/18 17:33


Risperidone (Risperdal Consta)  25 mg IM Q14D [facility]


   Last Admin: 03/05/18 10:51 Dose:  25 mg


Sodium Chloride (Flush - Normal Saline)  10 ml IVF Q12HR [facility]


   Last Admin: 03/07/18 23:33 Dose:  Not Given


Sodium Chloride (Flush - Normal Saline)  10 ml IVF PRN PRN


   PRN Reason: Saline Flush


   Last Admin: 03/07/18 20:42 Dose:  10 ml


Spironolactone (Aldactone)  25 mg PO QAM-[facility]


   Last Admin: 03/07/18 05:18 Dose:  25 mg


Tramadol HCl (Ultram)  50 mg PO Q6H PRN


   PRN Reason: Moderate Pain (4-6)


Valsartan (Diovan)  80 mg PO BID [facility]


   Last Admin: 03/07/18 20:42 Dose:  80 mg

## 2018-03-08 NOTE — RAD
BARIUM SWALLOW ESOPHAGRAM:

 

Date:  03/08/18 

 

HISTORY:  

Dysphagia, achalasia. 

 

COMPARISON:  

None. 

 

EXPOSURE:

2.1 minutes. 

53.49 mGy*cm^2. 

 

FINDINGS:

Initial  chest radiograph demonstrates enlarged cardiac silhouette. Pulmonary vessels and hilum 
are normal. Costophrenic angles are clear. Chronic changes in lung parenchyma are suspected. No pneum
othorax. 

 

Persistent density projecting over the right lung apex. 

 

Patient administered effervescent crystals and thick barium. There is distention of the thoracic esop
hagus with abrupt caliber change in the distal esophagus, just proximal to the GE junction. There is 
evidence of stenosis with residual achalasia and thoracic esophageal distention. Cervical esophagus d
oes not have any obstructive processes. However, there is evidence of penetration. Given penetration,
 thin barium was not administered. 

 

IMPRESSION: 

1.  Penetration. Modified barium swallow with a speech pathologist is recommended. 

 

2.  Abrupt caliber change suggesting stenosis of the distal thoracic esophagus with resultant thoraci
c esophageal distention/achalasia. 

 

 

POS: [facility]

## 2018-03-09 VITALS — SYSTOLIC BLOOD PRESSURE: 110 MMHG | DIASTOLIC BLOOD PRESSURE: 77 MMHG

## 2018-03-09 VITALS — TEMPERATURE: 98.2 F

## 2018-03-09 RX ADMIN — Medication SCH ML: at 08:08

## 2018-03-09 RX ADMIN — PHENOL PRN SPR: 1.4 SPRAY ORAL at 11:10

## 2018-03-09 NOTE — PDOC.PN
- Subjective


Encounter Start Date: 03/09/18


Encounter Start Time: 08:00





Patient seen and examined. No new complaints. No overnight events





- Objective


MAR Reviewed: Yes


Vital Signs & Weight: 


 Vital Signs (12 hours)











  Temp Pulse Pulse Pulse Resp BP BP


 


 03/09/18 09:44    66  62    105/67


 


 03/09/18 08:03       125/87 


 


 03/09/18 07:56  98.2 F  65    14  


 


 03/09/18 03:35  98.2 F  75    18  














  BP BP BP Pulse Ox Pulse Ox Pulse Ox


 


 03/09/18 09:44  109/74     94 L  90 L


 


 03/09/18 08:03      


 


 03/09/18 07:56   125/87   94 L  


 


 03/09/18 03:35    125/82  94 L  








 Weight











Admit Weight                   245 lb 5.984 oz


 


Weight                         190 lb 9.6 oz











 Most Recent Monitor Data











Heart Rate from ECG            110


 


NIBP                           115/86


 


NIBP BP-Mean                   103


 


Respiration from ECG           24


 


SpO2                           93














I&O: 


 











 03/08/18 03/09/18 03/10/18





 06:59 06:59 06:59


 


Intake Total 1240 1354 


 


Output Total 1900 225 


 


Balance -660 1129 











Result Diagrams: 


 03/08/18 05:24





 03/08/18 05:24


Radiology Reviewed by me: Yes


EKG Reviewed by me: Yes





Phys Exam





- Physical Examination


Constitutional: NAD


HEENT: PERRLA, moist MMs, sclera anicteric


Neck: no JVD, supple


Respiratory: no wheezing, no rales, no rhonchi


Cardiovascular: RRR, no significant murmur, no rub


Gastrointestinal: soft, non-tender, no distention, positive bowel sounds


Musculoskeletal: no edema, pulses present


Neurological: non-focal, normal sensation, moves all 4 limbs


Psychiatric: normal affect, A&O x 3


Skin: no rash, normal turgor





Dx/Plan


(1) Acute on chronic combined systolic and diastolic congestive heart failure


Code(s): I50.43 - ACUTE ON CHRONIC COMBINED SYSTOLIC AND DIASTOLIC HRT FAIL   

Status: Acute   





(2) Aspiration pneumonia


Code(s): J69.0 - PNEUMONITIS DUE TO INHALATION OF FOOD AND VOMIT   Status: 

Resolved   


Qualifiers: 


   Laterality: left   Lung location: lower lobe of lung 





(3) Atrial fibrillation status post cardioversion


Code(s): I48.91 - UNSPECIFIED ATRIAL FIBRILLATION   Status: Acute   





(4) Hypokalemia


Code(s): E87.6 - HYPOKALEMIA   Status: Resolved   





(5) Nausea & vomiting


Code(s): R11.2 - NAUSEA WITH VOMITING, UNSPECIFIED   Status: Resolved   





(6) Bipolar disorder


Code(s): F31.9 - BIPOLAR DISORDER, UNSPECIFIED   Status: Chronic   





(7) Dyslipidemia


Code(s): E78.5 - HYPERLIPIDEMIA, UNSPECIFIED   Status: Chronic   





(8) Hypertension


Code(s): I10 - ESSENTIAL (PRIMARY) HYPERTENSION   Status: Chronic   





(9) ANDRES (obstructive sleep apnea)


Code(s): G47.33 - OBSTRUCTIVE SLEEP APNEA (ADULT) (PEDIATRIC)   Status: 

Suspected   





(10) Acute respiratory failure with hypoxia and hypercarbia


Code(s): J96.01 - ACUTE RESPIRATORY FAILURE WITH HYPOXIA; J96.02 - ACUTE 

RESPIRATORY FAILURE WITH HYPERCAPNIA   Status: Resolved   





(11) Chronic hepatitis C


Code(s): B18.2 - CHRONIC VIRAL HEPATITIS C   Status: Chronic   


Qualifiers: 


   Hepatic coma status: without hepatic coma   Qualified Code(s): B18.2 - 

Chronic viral hepatitis C   





- Plan


cont current plan of care





* medication reviewed as below


* symptomatic treatment


* dc to home after life vest if cardiology ok


* see discharge summery.








Review of Systems





- Review of Systems


Eyes: negative: Pain, Vision Change, Conjunctivae Inflammation, Eyelid 

Inflammation, Redness, Other


ENT: negative: Ear Pain, Ear Discharge, Nose Pain, Nose Discharge, Nose 

Congestion, Mouth Pain, Mouth Swelling, Throat Pain, Throat Swelling, Other


Respiratory: negative: Cough, Dry, Shortness of Breath, Hemoptysis, SOB with 

Excertion, Pleuritic Pain, Sputum, Wheezing


Cardiovascular: negative: chest pain, palpitations, orthopnea, paroxysmal 

nocturnal dyspnea, edema, light headedness, other


Gastrointestinal: negative: Nausea, Vomiting, Abdominal Pain, Diarrhea, 

Constipation, Melena, Hematochezia, Other


Genitourinary: negative: Dysuria, Frequency, Incontinence, Hematuria, Retention

, Other


Musculoskeletal: negative: Neck Pain, Shoulder Pain, Arm Pain, Back Pain, Hand 

Pain, Leg Pain, Foot Pain, Other


Skin: negative: Rash, Lesions, Ambrosio, Bruising, Other





- Medications/Allergies


Allergies/Adverse Reactions: 


 Allergies











Allergy/AdvReac Type Severity Reaction Status Date / Time


 


sulphur Allergy Intermediate sulphur Uncoded 02/26/18 12:25





   ointment/  





   rash  





   happened  





   as a child  











Medications: 


 Current Medications





Acetaminophen/Codeine Phosphate (Tylenol #3)  1 tab PO Q4H PRN


   PRN Reason: Mild Pain (1-3)


Acetaminophen/Codeine Phosphate (Tylenol #3)  2 tab PO Q4H PRN


   PRN Reason: Moderate Pain (4-6)


Amiodarone HCl (Cordarone)  200 mg PO DAILY [facility]


   Last Admin: 03/09/18 08:06 Dose:  200 mg


Aspirin (Ecotrin)  325 mg PO DAILY [facility]


   Last Admin: 03/09/18 08:04 Dose:  325 mg


Atorvastatin Calcium (Lipitor)  10 mg PO HS [facility]


   Last Admin: 03/08/18 20:37 Dose:  10 mg


Carvedilol (Coreg)  12.5 mg PO BID-[facility]


   Last Admin: 03/09/18 08:03 Dose:  12.5 mg


Dextrose/Water (Dextrose 50%)  25 gm IVP PRN PRN


   PRN Reason: HYPOGLYCEMIA PROTOCOL


Furosemide (Lasix)  40 mg PO DAILY-[facility]


   Last Admin: 03/09/18 08:06 Dose:  40 mg


Glucagon (Glucagon)  1 mg IM PRN PRN


   PRN Reason: HYPOGLYCEMIA PROTOCOL


Amiodarone HCl 450 mg/Miscellaneous Medication 1 each/ Dextrose/Water  259 mls 

@ 0 mls/hr IVPB INF JUNO; As Directed


   PRN Reason: Protocol


   Last Admin: 02/27/18 15:09 Dose:  259 mls


Dextrose/Water (D5w)  1,000 mls @ 0 mls/hr IV INF PRN; As Directed


   PRN Reason: HYPOGLYCEMIA PROTOCOL


Nitroglycerin (Nitrostat)  0.4 mg SL Q5MIN PRN


   PRN Reason: Chest Pain


Discontinue Previous Narcotic Pain Medications And Benzodiazepines  1 each FS 

.ONE [facility]


   Stop: 03/29/18 17:33


Phenol (Chloraseptic Spray 180 Ml Bot)  0 ml PO PRN PRN


   PRN Reason: SORE THROAT


   Last Admin: 03/08/18 20:37 Dose:  2 spr


Risperidone (Risperdal Consta)  25 mg IM Q14D [facility]


   Last Admin: 03/05/18 10:51 Dose:  25 mg


Sodium Chloride (Flush - Normal Saline)  10 ml IVF Q12HR [facility]


   Last Admin: 03/09/18 08:08 Dose:  10 ml


Sodium Chloride (Flush - Normal Saline)  10 ml IVF PRN PRN


   PRN Reason: Saline Flush


   Last Admin: 03/07/18 20:42 Dose:  10 ml


Spironolactone (Aldactone)  25 mg PO QAM-WM [facility]


   Last Admin: 03/09/18 08:05 Dose:  25 mg


Tramadol HCl (Ultram)  50 mg PO Q6H PRN


   PRN Reason: Moderate Pain (4-6)


Valsartan (Diovan)  80 mg PO BID [facility]


   Last Admin: 03/09/18 08:05 Dose:  80 mg

## 2018-03-09 NOTE — DIS
PRIMARY CARE PHYSICIAN:  Dr. Michelle Hernandez  

 

DATE OF ADMISSION:  02/27/2018  

 

DATE OF DISCHARGE:  03/09/2018

 

DISCHARGE DISPOSITION:  Home.

 

PRIMARY DISCHARGE DIAGNOSES:

1.  Status post cardioversion for atrial fibrillation.

2.  Acute respiratory failure with hypoxia.

3.  Aspiration pneumonia.

4.  Acute on chronic combined systolic and diastolic heart failure.

5.  Achalasia cardia.

6.  Hypokalemia.

7.  Nausea and vomiting.

 

SECONDARY DISCHARGE DIAGNOSES:  Chronic hepatitis C, bipolar disorder, cirrhosis of liver, portal hyp
ertension, hypertension, dyslipidemia.

 

PRIMARY PROCEDURE/OPERATION:  Cardiac catheterization showed normal coronaries with EF 25%.  Upper en
doscopy was performed by Dr. Lennon and found with a large amount of food particle and fluid and a fina
staltic esophagus.

 

RADIOLOGICAL INVESTIGATION:  Echocardiography showed EF 35-40%, large pleural effusion.  Chest x-ray 
showed bibasilar infiltration.  Barium swallow showed findings suggestive of achalasia.

 

SIGNIFICANT LABS:  WBC 7.7, hemoglobin 11.5, platelet 198.  INR 1.1.  Sodium 136, potassium 3.0, BUN 
8, creatinine 0.69.  LFT normal.  Blood culture negative.

 

DISCHARGE MEDICATIONS:  Amiodarone 200 mg p.o. daily, Eliquis 2.5 mg p.o. b.i.d., Lipitor 10 mg p.o. 
daily, Coreg 12.5 mg p.o. b.i.d., vitamin B12 1000 mcg p.o. daily, ferrous sulfate 325 mg p.o. daily,
  Lasix 40 mg p.o. daily, multivitamin 1 tablet p.o. daily, Aldactone 25 mg p.o. daily, valsartan 160
 mg p.o. daily, risperidone 25 mg IM as directed.

 

CONTRAINDICATIONS:  None.  

 

CODE STATUS:  Full code. .

 

INPATIENT CONSULTANTS:  Dr. Murillo was primary while in hospital.  Dr. Mcdonald was following for resp
iratory failure.  Dr. Lennon and Dr. Pinon was following for nausea and vomiting.

 

TEST RESULTS PENDING ON DISCHARGE:  None.

 

ALLERGIES:  SULFA.

 

DISCHARGE PLAN:  Post hospital, the patient will follow up with Dr. Michelle Hernandez in 7 days.  The nallely simons has appointment with Dr. Jean-Pierre Murillo on 04/04/2018 at 9:30 a.m.

 

HOSPITAL COURSE:  A 52-year-old male who was admitted by Dr. Murillo electively for cardioversion.  
He had transesophageal echocardiography and cardioversion was done successfully.  Post procedure the 
patient had acute respiratory failure, required intubation and he required ICU admission.  Pulmonary 
Critical Care, managing his ventilator.  Initially they suspected systolic heart failure versus aspir
ation pneumonia.  The patient was also getting broad spectrum antibiotic therapy.  Over the next coup
le of days the patient was extubated.  After extubation the patient was having nausea and vomiting an
d he was not tolerating diet and that is why we consulted GI.  At the same point primary team consult
ed us as well for medical management.  The patient was transferred to medical floor.  The patient und
erwent cardiac catheterization which showed normal coronaries, but EF 25% and that is why patient req
uired LifeVest before discharge which was arranged before discharge.  The patient also underwent uppe
r endoscopy which showed findings suspicious for a peristalsis of the esophagus.  Barium swallow was 
done and that finding consistent with achalasia cardia.  Given this patient has underlying cirrhosis,
 portal hypertension, esophageal varices, this patient was not a good candidate for any kind of surgi
miracle intervention for her achalasia and that is why GI recommended to continue conservative therapy an
d follow up with them as on outpatient basis.

 

The patient remained in sinus rhythm.  The patient will continue Eliquis therapy after cardioversion 
for about 1 month.  While in hospital dose of Coreg was increased and dose of amiodarone was reduced.
  The rest of medication was continued as per previous.  At this point, patient is medically stable f
or discharge.  

 

The patient seen and examined at bedside today.  Please see my progress note from today for further d
etail.

## 2018-06-14 ENCOUNTER — HOSPITAL ENCOUNTER (OUTPATIENT)
Dept: HOSPITAL 92 - ENDO/OP | Age: 53
End: 2018-06-14
Attending: INTERNAL MEDICINE
Payer: COMMERCIAL

## 2018-06-14 DIAGNOSIS — Z79.899: ICD-10-CM

## 2018-06-14 DIAGNOSIS — R18.8: ICD-10-CM

## 2018-06-14 DIAGNOSIS — F17.210: ICD-10-CM

## 2018-06-14 DIAGNOSIS — K74.60: ICD-10-CM

## 2018-06-14 DIAGNOSIS — I50.9: ICD-10-CM

## 2018-06-14 DIAGNOSIS — B18.2: ICD-10-CM

## 2018-06-14 DIAGNOSIS — R93.3: Primary | ICD-10-CM

## 2018-06-14 DIAGNOSIS — Z88.2: ICD-10-CM

## 2018-06-14 DIAGNOSIS — Z79.82: ICD-10-CM

## 2018-06-14 DIAGNOSIS — Z79.01: ICD-10-CM

## 2018-06-14 DIAGNOSIS — Z88.8: ICD-10-CM

## 2018-06-14 PROCEDURE — 91010 ESOPHAGUS MOTILITY STUDY: CPT
